# Patient Record
Sex: MALE | Race: BLACK OR AFRICAN AMERICAN | Employment: OTHER | URBAN - METROPOLITAN AREA
[De-identification: names, ages, dates, MRNs, and addresses within clinical notes are randomized per-mention and may not be internally consistent; named-entity substitution may affect disease eponyms.]

---

## 2021-06-22 PROBLEM — I63.9 CVA (CEREBRAL VASCULAR ACCIDENT) (HCC): Status: ACTIVE | Noted: 2021-06-22

## 2021-06-25 PROBLEM — Q23.1 BICUSPID AORTIC VALVE: Status: ACTIVE | Noted: 2021-06-25

## 2021-06-26 PROBLEM — Q90.9 DOWN SYNDROME: Status: ACTIVE | Noted: 2021-06-26

## 2021-06-26 PROBLEM — R13.12 OROPHARYNGEAL DYSPHAGIA: Status: ACTIVE | Noted: 2021-06-26

## 2021-06-26 PROBLEM — Z79.01 WARFARIN ANTICOAGULATION: Status: ACTIVE | Noted: 2021-06-26

## 2021-09-15 PROBLEM — Q23.1 BICUSPID AORTIC VALVE: Chronic | Status: ACTIVE | Noted: 2021-06-25

## 2021-09-15 PROBLEM — Q90.9 DOWN SYNDROME: Chronic | Status: ACTIVE | Noted: 2021-06-26

## 2021-09-15 PROBLEM — R55 SYNCOPE: Status: ACTIVE | Noted: 2021-09-15

## 2022-03-19 PROBLEM — I63.9 CVA (CEREBRAL VASCULAR ACCIDENT) (HCC): Status: ACTIVE | Noted: 2021-06-22

## 2022-03-19 PROBLEM — R55 SYNCOPE: Status: ACTIVE | Noted: 2021-09-15

## 2022-03-19 PROBLEM — Q90.9 DOWN SYNDROME: Status: ACTIVE | Noted: 2021-06-26

## 2022-03-19 PROBLEM — R13.12 OROPHARYNGEAL DYSPHAGIA: Status: ACTIVE | Noted: 2021-06-26

## 2022-03-19 PROBLEM — Q23.1 BICUSPID AORTIC VALVE: Status: ACTIVE | Noted: 2021-06-25

## 2022-03-20 PROBLEM — Z79.01 WARFARIN ANTICOAGULATION: Status: ACTIVE | Noted: 2021-06-26

## 2022-03-28 ENCOUNTER — HOSPITAL ENCOUNTER (OUTPATIENT)
Dept: PHYSICAL THERAPY | Age: 52
Discharge: HOME | End: 2022-03-28
Payer: MEDICARE

## 2022-03-28 PROCEDURE — 97110 THERAPEUTIC EXERCISES: CPT

## 2022-03-28 PROCEDURE — 97166 OT EVAL MOD COMPLEX 45 MIN: CPT

## 2022-03-28 PROCEDURE — 97530 THERAPEUTIC ACTIVITIES: CPT

## 2022-03-28 NOTE — PROGRESS NOTES
[facility] [place] [facility] [employer]  JADYN  [address] [facility]  [phone] fax    Plan of Care/Statement of Necessity for Occupational Therapy Services    Patient name: Moraima Millan Start of Care: 3/28/2022   Referral source: Shante Jeong : 1970    Medical Diagnosis: Weakness of left upper extremity [R29.898]  Payor: Uday Racer / Plan: [address] / Product Type: [employer] Care Medicare /  Onset Date:21    Treatment Diagnosis: WEakness, inattention left UE   Prior Hospitalization: see medical history Provider#: [id number]   Medications: Verified on Patient summary List    Comorbidities: Downs syndrome, aortic valve replacement, corneal transplant, colon resection, right CVA   Prior Level of Function: Independent self care, helped fold laundry, put away groceries, could make sandwich          The Plan of Care and following information is based on the information from the initial evaluation. Assessment/ key information: pt is a right hand dominant, 45 y.o.y/o, male who had sudden onset of left side weakness and facial droop due to his/her right CVA  injury on 21 and went to [facility] x 5 days, then had home health x 1 month ended 22. Mother is primary caregiver and provides all medical history. Prior to CVA, patient was fully independent in self care without assistive device and helped with some home care tasks. At present he requires stand by assist during ambulation due to safety issues and left neglect. He does not follow commands well due to intellectual disability as well as left neglect. Standardized testing of muscle strength is not possible.  and pinch measures do not reflect true abilities. He is able to complete fine motor tasks with reduced speed with left and and has  Evidence of left neglect. Sensory awareness of left upper extremity appears intact.   He denies upper extremity pain per mother. He has limited left shoulder flexion and abduction, elbow extension and moves in a modified synergy pattern. Praxis errors are evident in left UE. He currently requires assist in all self care except feeding and must have standby assist in transfers. His FOTO is 48/100 reflecting moderate impairment in function. He will benefit from skilled occupational therapy to improve left UE function and return to prior level of independence and safety in self care and home care. Evaluation Complexity: History MEDIUM Complexity : Expanded review of history including physical, cognitive and psychosocial  history  Examination MEDIUM Complexity : 3-5 performance deficits relating to physical, cognitive , or psychosocial skils that result in activity limitations and / or participation restrictions Clinical Decision Making MEDIUM Complexity : Patient may present with comorbidities that affect occupational performnce.  Miniml to moderate modification of tasks or assistance (eg, physical or verbal ) with assesment(s) is necessary to enable patient to complete evaluation   Overall Complexity Rating: MEDIUM    Problem List: Decreased range of motion, Decreased strength, Decreased coordination/prehension, Decreased ADL/functional abilities  and Decreased transfer abilities     Treatment Plan may include any combination of the following: Therapeutic exercise, Therapeutic activities, Neuromuscular re-education, Patient education and ADLs/IADLs    Patient / Family readiness to learn indicated by: trying to perform skills and interest    Persons(s) to be included in education:   patient (P) and family support person (FSP);list mom    Barriers to Learning/Limitations: yes;  cognitive, reading/writing and sensory deficits-vision/hearing/speech    Patient Goal (s): Per mom, more independent in self care, more use of arm, transfers    Patient Self Reported Health Status: good    Rehabilitation Potential: good    Short Term Goals: To be accomplished in 2 weeks:  1. Mother will be independent in proper procedure for left UE ROM. 2.  Patient will be able to reach to place items with left hand with 50% accuracy to shoulder level. 3.  Patient will be supervision level for left hand strengthening. 4.  Patient will be supervision level for AAROM ( bimanual ) tasks    Long Term Goals: To be accomplished in 4 weeks:   1. Patient will be able to don and doff all clothing with minimal assist for safety. 2.  Patient will be able to fold laundry using left hand assist.  3.  Patient will achieve at least 100 degrees of left shoulder flexion to hang clothes in closet. 4   Patient will improve ability to use left hand as assist as shown by increase in FOTO of at least 10 points. Frequency / Duration: Patient to be seen 2-3 times per week for 4 weeks:    Patient/ Caregiver education and instruction: Diagnosis, prognosis, self care, activity modification and exercises  [x]  Plan of care has been reviewed with B[facility]    Certification Period: 3/28/22-4/27/22    KASANDRA Dallas/L 3/28/2022 1:02 PM      ________________________________________________________________________    I certify that the above Therapy Services are being furnished while the patient is under my care. I agree with the treatment plan and certify that this therapy is necessary.     [address] [place] [address] Signature:____________Date:_________TIME:________     Mathew Mercado PA-C  ** Signature, Date and Time must be completed for valid certification **    Please sign and return to [facility] [employer]   [address] [place] [address]  [phone] fax

## 2022-03-28 NOTE — PROGRESS NOTES
OT DAILY TREATMENT NOTE     Patient Name: Lamine Bennett  Date:3/28/2022  : 1970  [x]  Patient  Verified  Payor: [facility] MEDICARE / Plan: [address] / Product Type: Managed Care Medicare /    In time:1115  Out time:1200  Total Treatment Time (min): 45  Visit #: 1 of 12    Medicare/BCBS Only   Total Timed Codes (min):  23 1:1 Treatment Time:  45     Treatment Area: Stroke (cerebrum) ([facility] [address] [I63.9]    SUBJECTIVE  Pain Level (0-10 scale): 0/10  Any medication changes, allergies to medications, adverse drug reactions, diagnosis change, or new procedure performed?: [x] No    [] Yes (see summary sheet for update)  Subjective functional status/changes:   [] No changes reported  Mom reports patient indicates pain in right leg. OBJECTIVE      22 min [x]Eval                  []Re-Eval       13 min Therapeutic Exercise:  [] See flow sheet :   Rationale: increase ROM to improve the patients ability to reach  PROM, AAROM left UE with guiding of scapula  Review of proper hand position to improve shoulder ROM    10 min Therapeutic Activity:  []  See flow sheet :   Rationale: increase ROM, increase strength and improve coordination  to improve the patients ability to use left UE  Beach ball activity BUEs promote awareness and use of left UE in functional tasks         With   [] TE   [] TA   [] neuro   [] other: Patient Education: [x] Review HEP    [] Progressed/Changed HEP based on: proper grasp of UE during ROM, importance os scapular mobility  [] positioning   [] body mechanics   [] transfers   [] heat/ice application   [] Splint wear/care   [] Sensory re-education   [] scar management      [] other:            Other Objective/Functional Measures:   Subjective: pt is a right hand dominant, 45 y.o.y/o, male who sustained his/her right CVA  injury on 21 and went to [facility] x 5 days, then had home health x 1 month ended 22.     Prior level of function: I self care, folding clothes, could prepare cold meals, helped carry items into home  Pain level:(0-no pain 10-debilitating pain) no    Description/Location: right leg with c/o intermittent rlief   Worst pain10/10 Least pain 0/10   Activities which aggravate pain: night   Activities which ease pain: chiropractor,   Current functional limitations/living situation: with mom, her  and sister in home. REquires assist for all self care except eating. Medical hx: L3 colon resection, Down's syndrome, aortic valve replacement, corneal transplants    Medications: See the written copy of this report in the patient's paper medical record.        Objective:  Sensation:Aware of touch, unable to eval precisely due to intellectual disability  connie of Motion:Limited left UE AROM, passive elbow WNL, limited left shoulder PROM in flexion and abduction  Strength:Functional right UE left unable to be assessed accurately due to difficulty following instrucitons  Hand ROM left hand loose fist, no pain with assist to tighter fist  Hand Strength:   Gross Grasp  Lateral Pinch    Right  7  4    Left 3  4      Nine-Hole Peg Test:  Left= _405____seconds  Right=_103____seconds  Finger Opposition:Unable to test    Palpation: no significant subluxation noted    ADLs  Feeding:        []MaxA   []ModA   [x]Rajat   [] CGA   []SBA   []Muna   []Independent  UE Dressing:       [x]MaxA   []ModA   []Rajat   [] CGA   []SBA   []Muna   []Independent  LE Dressing:       [x]MaxA   []ModA   []Rajat   [] CGA   []SBA   []Muna   []Independent  Grooming:       []MaxA   [x]ModA   []Rajat   [] CGA   []SBA   []Muna   []Independent  Toileting:       [x]MaxA   []ModA   []Rajat   [] CGA   []SBA   []Muna   []Independent  Bathing:       [x]MaxA   []ModA   []Rajat   [] CGA   []SBA   []Muna   []Independent  Light Meal Prep:    [x]MaxA   []ModA   []Rajat   [] CGA   []SBA   []Muna   []Independent  Household/Other:  [x]MaxA   []ModA   []Rajat   [] CGA   []SBA   []Muna []Independent  Adaptive Equip:     []MaxA   []ModA   []Rajat   [] CGA   []SBA   []Muna   []Independent  Driving:       [x]MaxA   []ModA   []Rajat   [] CGA   []SBA   []Muna   []Independent  Money Mgmt:        [x]MaxA   []ModA   []Rajat   [] CGA   []SBA   []Muna   []Independent    Coordination   GM                           FM []WFL          [x] Impaired   []WFL          [x] Impaired    Tone/Motor Control []WFL          [x] Impaired    Midline Symmetry []WFL          [x] Impaired    Visual Perception:                    R/L   Neglect           R/L Discrimination                   Body Scheme []WFL          [] Impaired Unable  []WFL          [x] Impaired     []WFL          [x] Impaired     []WFL          [x] Impaired    Visual Motor Skills                           Tracking                           Tracing                            Writing   [x]WFL          [] Impaired     []WFL          [] Impaired NT  []WFL          [] Impaired NT   Cognition []Person         []Place            []Date   []Situation/ Behavior Unable to assess   Follows Commands  [x]     1-step  [] 2-step   []Multi-step      Memory:                             STM                             LTM   []WFL          [] Impaired UTT  []WFL          [] Impaired UTT   Safety Awareness []WFL          [x] Impaired    Judgment  []WFL          [x] Impaired    Express Needs []WFL          [x] Impaired           Pain Level (0-10 scale) post treatment: 0/10    ASSESSMENT/Changes in Function:    [x]  See Plan of Care  []  See progress note/recertification  []  See Discharge Summary             PLAN  []  Upgrade activities as tolerated     []  Continue plan of care  []  Update interventions per flow sheet       []  Discharge due to:_  []  Other:_      Melissa Moore, OTR/L 3/28/2022  11:11 AM    Future Appointments   Date Time Provider Janis Conteh   3/28/2022 11:15 AM Laura Babb, OTR/L [facility]   3/31/2022 12:45 PM Farrah Pradhan, PT [facility] 8/18/2022  2:00 PM Freddie Man PA-C [id number]

## 2022-03-31 ENCOUNTER — HOSPITAL ENCOUNTER (OUTPATIENT)
Dept: PHYSICAL THERAPY | Age: 52
Discharge: HOME | End: 2022-03-31
Payer: MEDICARE

## 2022-03-31 PROCEDURE — 97530 THERAPEUTIC ACTIVITIES: CPT

## 2022-03-31 PROCEDURE — 97162 PT EVAL MOD COMPLEX 30 MIN: CPT

## 2022-03-31 NOTE — PROGRESS NOTES
PT DAILY TREATMENT NOTE/NEURO EVAL     Patient Name: Lonell Schilder  Date:3/31/2022  : 1970  [x]  Patient  Verified  Payor: [employer] MEDICARE / Plan: [address] / Product Type: Managed Care Medicare /    In time:12:47A  Out time:1:35P      Total Treatment Time (min): 50  Visit #: 1 of 12    Medicare/BCBS Only   Total Timed Codes (min):  25 1:1 Treatment Time:  48     Treatment Area: Stroke (cerebrum) ([facility] [address] [I63.9]    SUBJECTIVE  Pain Level (0-10 scale): patient reported pain, unable to assess numerical value  []constant []intermittent []improving []worsening []no change since onset    Any medication changes, allergies to medications, adverse drug reactions, diagnosis change, or new procedure performed?: [x] No    [] Yes (see summary sheet for update)  Subjective functional status/changes:         Comorbidities:Down syndrome, aortic valve replacement, corneal transplant, colon resection, right CVA   Prior Level of Function: lives in 1 story home with family (mother, father, and sister-caregiver)                            The Plan of Care and following information is based on the information from the initial evaluation. Assessment/ key information: Patient is a 54-year-old right-handed male who presents to therapy s/p right  CVA of 2021. Patient had home health therapy in January. Mother reports there is still weakness in the left UE and LE. Patient was independent with ambulation prior to CVA. He has difficulty navigating steps to enter/exit garage. Patient had been having pain in the right LE caused by nerve irritation at L3-L4 vertebral levels; patient has gotten relief from chiropractic care. She reports subluxation of the shoulder has improved. Patient has not had any recent falls but demonstrates misstep/near LOB occasionally. Patient ambulates with decreased gait speed and shuffled steps; standby to [place] [id number] for safety.  Left shoulder AROM into elevation grossly limited with therapist donning/doffing gait belt. Right LE strength at least 3/5; left LE strength slightly diminished (3-/5). Unable to formally assess MMT due to patient with difficulty understanding testing sequencing. Supervision to standby for sit to stand following cues for scooting forward on seat and hand/foot placement. Patient demonstrates left neglect with patient grazing left side of doorway with entering and exiting evaluation room and attempted to turn right each time upon entering and exiting. Patient required max verbal, visual, and tactile for instruction/setup with decreased cues needed for following reps of activities. Patient required heavy handheld assist and had max difficulty with attempting to clear low jennifer. He was able to step around/weave through 1 of 3 cones placed on floor; unclear if due to cognition or left neglect. Patient would benefit from skilled outpatient PT to address above mentioned deficits to return to prior level of function, increase independence with ADLs, and improve overall quality of life.     Patients mother grants permission for progress notes to be sent to  neurologist  Edson Whaley MD.      23 min [x]Eval                  []Re-Eval         25 min Therapeutic Activity:  [x]  See flow sheet : patient and family education   Rationale: increase ROM, increase strength, improve coordination, improve balance and increase proprioception  to improve the patients ability to perform ADLs with increased ease             With   [] TE   [x] TA   [] neuro   [] other: Patient Education: [x] Review HEP    [] Progressed/Changed HEP based on:   [] positioning   [] body mechanics   [] transfers   [] heat/ice application    [x] other: diagnosis, prognosis, POC, purpose and importance of therapy; anatomy and physiology as it relates to current condition; examination findings; limitations with progression due to comorbidities and impaired cognition; obtaining gait belt for safety/stability at home; having patient do as much as possible safely to increase patient's strength/independence and reduce caregiver burden     Other Objective/Functional Measures:     Physical Therapy Evaluation - Neurologic    Posture: [] Poor    [x] Fair    [] Good    Describe:       Gait: [] Normal    [x] Abnormal    Device: shuffled steps, decreased gait speed    Describe:    Left shoulder AROM limited into elevation      Strength (MMT): at least 3/5 for R LEs into hip flex, knee flex, knee ext, and ankle DF; left grossly 3/5    Reflexes: [x] Not Tested   Left Right   Biceps (C5)     Brachioradiais (C6)     Triceps (C7)     Knee Jerk (L4)     Ankle Jerk (SI)       Balance/ Equilibrium:           Sitting Balance: Static:  [x] Good    [] Fair    [] Poor     Dynamic:   [] Good    [] Fair    [] Poor        Standing Balance: Static:   [] Good    [x] Fair    [] Poor     Dynamic:   [] Good    [] Fair    [x] Poor        Protective Extension:  [] Present    [] Delayed    [] Absent        Single Leg Stance:         Eyes Open  Eyes Closed   L  L    R  R        Functional Mobility      Bed Mobility:      Scooting:        Rolling:       Sit-Supine:      Transfers:       Sit-Stand:       Floor-Stand:       Gait:       Tandem:       Backwards:       Braiding:      Elevations:       Curbs:      Ramps:      Stairs:    Behavior: [x] Cooperative    [] Impulsive    [] Agitated    [] Perseverative    [] Confused   Oriented x:    Cognition: [x] One Step Commands   [] Multiple Commands   [] Displays Neglect [] R  [x] L    Other:       Impaired Judgement: [] Y    [x] N      Impaired Vision:  [x] Y    [] N      Safety Awareness Deficits  [x] Y    [] N      Impaired Hearing  [] Y    [x] N      Able to Express Needs [] Y    [x] N    Optional Tests:       Dynamic Gait Index (24pt scale): Functional Gait Assessment (30pt scale):       Morin Balance Scale (56pt scale):     Other test /comments:     Max verbal, visual, and tactile cues for instruction/setup with decreased cues needed for following reps of activities  Supervision to standby for sit to stand following cues for scooting forward on seat and hand/foot placement  Patient able to respond with mainly \"yes\" or with difficult to understand responses  Instances of left neglect-patient grazed left side of doorway with entering and exiting evaluation room and attempted to turn right each time upon entering and exiting  Low hurdles-max handheld assist and difficulty with clearance  Able to step around/weave through 1 of 3 cones-unclear if due to cognition or left neglect       Pain Level (0-10 scale) post treatment: \"yes\"-pointing to left hip    ASSESSMENT/Changes in Function: See POC    Patient will continue to benefit from skilled PT services to modify and progress therapeutic interventions, address functional mobility deficits, address ROM deficits, address strength deficits, analyze and address soft tissue restrictions, analyze and cue movement patterns, analyze and modify body mechanics/ergonomics, assess and modify postural abnormalities, address imbalance/dizziness and instruct in home and community integration to attain remaining goals.      [x]  See Plan of Care  []  See progress note/recertification  []  See Discharge Summary         Progress towards goals / Updated goals:  See POC    PLAN  [x]  Upgrade activities as tolerated     [x]  Continue plan of care  []  Update interventions per flow sheet       []  Discharge due to:_  []  Other:_      Estela Casarez, PT 3/31/2022  12:35 PM

## 2022-03-31 NOTE — PROGRESS NOTES
[facility] JADYN  [address] [place] [address]  [phone] fax    Plan of Care/ Statement of Necessity for Physical Therapy Services    Patient name: Jacques Suárez Start of Care: 3/31/2022   Referral source: Melchor Bartholomew : 1970    Medical Diagnosis: Stroke (cerebrum) ([address] [I63.9]  Payor: Venkat Babb / Plan: [address] / Product Type: [employer] Medicare /  Onset Date:2021   Treatment Diagnosis: Bilateral LE weakness, abnormalities of gait and mobility   Prior Hospitalization: see medical history Provider#: [id number]   Medications: Verified on Patient summary List    Comorbidities:Down syndrome, aortic valve replacement, corneal transplant, colon resection, right CVA   Prior Level of Function: lives in 1 story home with family (mother, father, and sister-caregiver)      The Plan of Care and following information is based on the information from the initial evaluation. Assessment/ key information: Patient is a 62-year-old right-handed male who presents to therapy s/p right  CVA of 2021. Patient had home health therapy in January. Mother reports there is still weakness in the left UE and LE. Patient was independent with ambulation prior to CVA. He has difficulty navigating steps to enter/exit garage. Patient had been having pain in the right LE caused by nerve irritation at L3-L4 vertebral levels; patient has gotten relief from chiropractic care. She reports subluxation of the shoulder has improved. Patient has not had any recent falls but demonstrates misstep/near LOB occasionally. Patient ambulates with decreased gait speed and shuffled steps; [facility]. Left shoulder AROM into elevation grossly limited with therapist donning/doffing gait belt. Right LE strength at least 3/5; left LE strength slightly diminished (3-/5).  Unable to formally assess MMT due to patient with difficulty understanding testing sequencing. Supervision to standby for sit to stand following cues for scooting forward on seat and hand/foot placement. Patient demonstrates left neglect with patient grazing left side of doorway with entering and exiting evaluation room and attempted to turn right each time upon entering and exiting. Patient required max verbal, visual, and tactile for instruction/setup with decreased cues needed for following reps of activities. Patient required heavy handheld assist and had max difficulty with attempting to clear low jennifer. He was able to step around/weave through 1 of 3 cones placed on floor; unclear if due to cognition or left neglect. Patient would benefit from skilled outpatient PT to address above mentioned deficits to return to prior level of function, increase independence with ADLs, and improve overall quality of life. Mother present for evaluation and provided subjective information.      Evaluation Complexity History HIGH Complexity :3+ comorbidities / personal factors will impact the outcome/ POC ; Examination HIGH Complexity : 4+ Standardized tests and measures addressing body structure, function, activity limitation and / or participation in recreation  ;Presentation MEDIUM Complexity : Evolving with changing characteristics  ; Clinical Decision Making MEDIUM Complexity : FOTO score of 26-74  Overall Complexity Rating: MEDIUM  Problem List: pain affecting function, decrease ROM, decrease strength, impaired gait/ balance, decrease ADL/ functional abilitiies, decrease activity tolerance, decrease flexibility/ joint mobility and decrease transfer abilities   Treatment Plan may include any combination of the following: Therapeutic exercise, Therapeutic activities, Neuromuscular re-education, Physical agent/modality, Gait/balance training, Manual therapy, Patient education, Self Care training, Functional mobility training, Home safety training and Stair training  Patient / Family readiness to learn indicated by: asking questions, trying to perform skills and interest  Persons(s) to be included in education: patient (P) and family support person (FSP);list: mother  Barriers to Learning/Limitations: yes;  cognitive  Patient Goal (s): walk better and have better balance  Patient Self Reported Health Status: fair  Rehabilitation Potential: fair    Short Term Goals: To be accomplished in 1 weeks:   1. Patient and family will report compliance with initial HEP to optimize therapy outcomes. Long Term Goals: To be accomplished in 4 weeks:   1. Patient will improve FOTO score by at least 5 points in order to demonstrate functional improvement. 2. Patient will report \"no difficulty\" with \"Walk[ing] around 1 level (floor) of your home\" with FOTO in order to demonstrate improved tolerance to daily tasks. 3. Patient will be able to perform sit to stand transfer with no more than mod I and min verbal cues to navigate home with increased ease. 4. Patient will be able to ambulate at least 50 ft with no more than supervision and without LOB in order to navigate home with increased safety. 5. Patient will be able to ascend/descend at least 4\" step x3 in order to progress with stair navigation. Frequency / Duration: Patient to be seen 2-3 times per week for 4 weeks. Patient/ Caregiver education and instruction: Diagnosis, prognosis, self care, activity modification and exercises   [x]  Plan of care has been reviewed with PTA    Certification Period: 3/31/22-4/29/22  Oj Perez, PT 3/31/2022 12:36 PM    ________________________________________________________________________    I certify that the above Therapy Services are being furnished while the patient is under my care. I agree with the treatment plan and certify that this therapy is necessary.     Physician's Signature:____________Date:_________TIME:________     Kimmy Triana PA-C  ** Signature, Date and Time must be completed for valid certification **    Please sign and return to [facility]  [address] [place] [address]  [phone] fax

## 2022-04-04 ENCOUNTER — HOSPITAL ENCOUNTER (OUTPATIENT)
Dept: PHYSICAL THERAPY | Age: 52
Discharge: HOME | End: 2022-04-04
Payer: MEDICARE

## 2022-04-04 PROCEDURE — 97530 THERAPEUTIC ACTIVITIES: CPT

## 2022-04-04 PROCEDURE — 97535 SELF CARE MNGMENT TRAINING: CPT

## 2022-04-04 PROCEDURE — 97112 NEUROMUSCULAR REEDUCATION: CPT

## 2022-04-04 NOTE — PROGRESS NOTES
OT DAILY TREATMENT NOTE     Patient Name: Nando Nesbitt  Date:2022  : 1970  [x]  Patient  Verified  Payor: [employer] MEDICARE / Plan: [address] / Product Type: Managed Care Medicare /    In time:1245  Out time:130  Total Treatment Time (min): 45  Visit #: 2 of 8    Medicare/BCBS Only   Total Timed Codes (min):  45 1:1 Treatment Time:  45     Treatment Area: Weakness of left upper extremity [R29.898]    SUBJECTIVE  Pain Level (0-10 scale): 0/10  Any medication changes, allergies to medications, adverse drug reactions, diagnosis change, or new procedure performed?: [x] No    [] Yes (see summary sheet for update)  Subjective functional status/changes:   [] No changes reported  I can't believe he is doing that    OBJECTIVE          15 min Therapeutic Activity:  []  See flow sheet :   Rationale: increase ROM and improve coordination  to improve the patients ability to reaching  Oven mitt on right to force use of left  Saebo tree levels 1-2 x 4 with left hand  Cone stacking from center to left side with left hand          30 min Self Care/Home Management: fasteners   Rationale: improve coordination  to improve the patients ability to use left hand  REmoved both hands with assist to locate lace to pull  Replaced both shoes with min assist to get back up  Removed right sock, replaced right sock with both hands  Shoetying trial on shoe then on board  Removed belt from keeper      With   [] TE   [] TA   [] neuro   [] other: Patient Education: [x] Review HEP    [] Progressed/Changed HEP based on: use of oven mitt to prevent use of right hand  [] positioning   [] body mechanics   [] transfers   [] heat/ice application   [] Splint wear/care   [] Sensory re-education   [] scar management      [] other:            Other Objective/Functional Measures:   Min assist to don shoes with help for tying  No assist for right sock     Pain Level (0-10 scale) post treatment: 0/10    ASSESSMENT/Changes in Function: Improving use of right hand with less neglect    Patient will continue to benefit from skilled OT services to modify and progress therapeutic interventions, address ROM deficits, address strength deficits, analyze and address soft tissue restrictions, analyze and cue movement patterns and instruct in home and community integration to attain remaining goals. []  See Plan of Care  []  See progress note/recertification  []  See Discharge Summary         Progress towards goals / Updated goals:  1. Mother will be independent in proper procedure for left UE ROM. 2.  Patient will be able to reach to place items with left hand with 50% accuracy to shoulder level. 4/4 progressing with cone stakcing and saebo tree  3. Patient will be supervision level for left hand strengthening. 4.  Patient will be supervision level for AAROM ( bimanual ) tasks     Long Term Goals: To be accomplished in 4 weeks:              1.  Patient will be able to don and doff all clothing with minimal assist for safety. 4/4 progressing with shoes and socks  2. Patient will be able to fold laundry using left hand assist.  3.  Patient will achieve at least 100 degrees of left shoulder flexion to hang clothes in closet. 4   Patient will improve ability to use left hand as assist as shown by increase in FOTO of at least 10 points.     PLAN  []  Upgrade activities as tolerated     []  Continue plan of care  []  Update interventions per flow sheet       []  Discharge due to:_  []  Other:_      Arturo Palafox OTR/L 4/4/2022  8:14 AM    Future Appointments   Date Time Provider Janis Conteh   4/4/2022 12:45 PM Aleatha Dancer, OTR/L MMCPTPB [facility]   4/4/2022  1:30 PM Farrah Staples, PT MMCPTP[facility]   4/5/2022 10:30 AM Farrah Staples, PT [facility]CPTP[facility]   4/12/2022  2:15 PM Rozell Osler, PTA [facility]   4/12/2022  3:00 PM Aleatha Dancer, OTR/L MMCPTPB [facility]   4/15/2022  3:00 PM Rozell Osler, PTA [facility]CPTP[facility]   4/15/2022  3:45 PM Ananth Normandavid PQCSFLK [facility]   4/18/2022 12:45 PM Ryder Shen, OTR/L [facility]CPTPB [facility]   4/18/2022  1:30 PM Farrah Martins, PT [id number] [facility]   4/21/2022 12:45 PM Farrah Martins, PT MMCPTPB [facility]   4/21/2022  1:30 PM Ryder Shen, OTR/L MMCPTPB [facility]   4/25/2022  1:30 PM Farrah Martins, PT EBUMIUY [facility]   4/25/2022  2:15 PM Ryder Shen, OTR/L MMCPTPB [facility]   4/28/2022 12:45 PM Farrah Martins, PT MMCPTPB [facility]   4/28/2022  1:30 PM Ryder Shen, OTR/L UUSGVCM [facility]   8/18/2022  2:00 PM Lisette Vidal, [address]

## 2022-04-04 NOTE — PROGRESS NOTES
PT DAILY TREATMENT NOTE     Patient Name: Mary Topete  Date:2022  : 1970  [x]  Patient  Verified  Payor: [place] [employer] MEDICARE / Plan: [address] / Product Type: Managed Care Medicare /    In time:1:30P  Out time:2:08P  Total Treatment Time (min): 38  Visit #: 2 of 12    Medicare/BCBS Only   Total Timed Codes (min):  38 1:1 Treatment Time:  38       Treatment Area: Lower extremity weakness [R29.898]  Abnormal gait [R26.9]    SUBJECTIVE  Pain Level (0-10 scale): \"yes\"  Any medication changes, allergies to medications, adverse drug reactions, diagnosis change, or new procedure performed?: [x] No    [] Yes (see summary sheet for update)  Subjective functional status/changes:   [] No changes reported    Patient's mother reports he prefers evening times as he is a \"night owl\".     OBJECTIVE       13 min Therapeutic Activity:  [x]  See flow sheet : glory carcamo, patient and family education   Rationale: increase ROM, increase strength and increase proprioception  to improve the patients ability to perform functional tasks with increased ease     25 min Neuromuscular Re-education:  [x]  See flow sheet :   Rationale: increase ROM, increase strength, improve coordination, improve balance and increase proprioception  to improve the patients ability to perform functional tasks with increased eae              With   [] TE   [x] TA   [] neuro   [] other: Patient Education: [x] Review HEP    [] Progressed/Changed HEP based on:   [] positioning   [] body mechanics   [] transfers   [] heat/ice application    [x] other: ability to purchase gait belt with handles for increased stability with transfers/ambulation     Other Objective/Functional Measures:     Marching-able to perform with unilateral UE support; increased verbal, visual, and tactile cues to perform with alternating LEs but able to perform later in session with B UE support with instruction to \"march\" with decreased range  Sidesteps-max cues to shift hand placement and lift right UE but only min cues to lift/place left LE   Cone taps-BUE support-difficulty with command following with cone taps (placing specific LE to specific color cone) but able to perform 4 cone taps in a row with unilateral UE support   Step ups-able to perform B with only left UE support; attempted to perform taps or step up without UE support/decreased support with palm just placed on bar but patient would grab onto bar  Cone weaving-max verbal and visual cues but able to follow directions for going to right or left   Unable to perform low jennifer-patient did no attempt; mother reported patient may be tired; patient responded \"yes\" when asked if he was tired  Two seated rest breaks during session    Left UE support primarily and therapist standing on left side to encourage awareness of left side     Pain Level (0-10 scale) post treatment: \"no\"    ASSESSMENT/Changes in Function: Patient demonstrates difficulty with command following requiring mod to max cues for setup/sequencing with activities in therapy. Increased cues for marching with right LE possibly due to decreased left LE balance/stability. He ambulates with decreased gait speed and decreased foot clearance B limiting independence with ambulation. Ability to step around cones (right then left) improved this visit. Patient with decreased overall endurance leading to seated rest breaks throughout session and limiting ability to complete jennifer step overs at end of session.      Patient will continue to benefit from skilled PT services to modify and progress therapeutic interventions, address functional mobility deficits, address ROM deficits, address strength deficits, analyze and address soft tissue restrictions, analyze and cue movement patterns, analyze and modify body mechanics/ergonomics, assess and modify postural abnormalities, address imbalance/dizziness and instruct in home and community integration to attain remaining goals. Progress towards goals / Updated goals:  Short Term Goals: To be accomplished in 1 weeks:              1. Patient and family will report compliance with initial HEP to optimize therapy outcomes. Long Term Goals: To be accomplished in 4 weeks:              1. Patient will improve FOTO score by at least 5 points in order to demonstrate functional improvement. 2. Patient will report \"no difficulty\" with \"Walk[ing] around 1 level (floor) of your home\" with FOTO in order to demonstrate improved tolerance to daily tasks. 3. Patient will be able to perform sit to stand transfer with no more than mod I and min verbal cues to navigate home with increased ease. 4. Patient will be able to ambulate at least 50 ft with no more than supervision and without LOB in order to navigate home with increased safety. 5. Patient will be able to ascend/descend at least 4\" step x3 in order to progress with stair navigation.     PLAN  [x]  Upgrade activities as tolerated     [x]  Continue plan of care  []  Update interventions per flow sheet       []  Discharge due to:_  []  Other:_      Lindy Betts, PT 4/4/2022  8:55 AM    Future Appointments   Date Time Provider Janis Conteh   4/4/2022 12:45 PM Berkley Gayle, OTR/L MMCPTPB [facility]   4/4/2022  1:30 PM Farrah Tovar, PT MQLPICB [facility]   4/5/2022 10:30 AM Farrah Tovar, PT MMCPTPB [facility]   4/12/2022  2:15 PM Karolina Velasco, PTA [facility]CPTP[facility]   4/12/2022  3:00 PM Berkley Gayle, OTR/L MMCPTPB [facility]   4/15/2022  3:00 PM Karolina Velasco, PTA MMCPTP[facility]   4/15/2022  3:45 PM Juan Jackson SHRSCDP [facility]   4/18/2022 12:45 PM Berkley Gayle, OTR/L MMCPTPB [facility]   4/18/2022  1:30 PM Farrah Tovar, PT MMCPTPB [facility]   4/21/2022 12:45 PM Fararh Tovar, PT MMCPTPB [facility]   4/21/2022  1:30 PM Berkley Gayle, OTR/L MMCPTPB [facility]   4/25/2022  1:30 PM Farrah Tovar, PT [id number] [facility]   4/25/2022  2:15 PM Domi Vanegas, OTR/L MMCPTPB [facility]   4/28/2022 12:45 PM Farrah Salazar, PT MMCPTPB [facility]   4/28/2022  1:30 PM Domi Vanegas, OTR/L IHGJCKM [facility]   8/18/2022  2:00 PM Freddie Man, [address]

## 2022-04-05 ENCOUNTER — HOSPITAL ENCOUNTER (OUTPATIENT)
Dept: PHYSICAL THERAPY | Age: 52
Discharge: HOME | End: 2022-04-05
Payer: MEDICARE

## 2022-04-05 PROCEDURE — 97530 THERAPEUTIC ACTIVITIES: CPT

## 2022-04-05 PROCEDURE — 97112 NEUROMUSCULAR REEDUCATION: CPT

## 2022-04-05 NOTE — PROGRESS NOTES
PT DAILY TREATMENT NOTE     Patient Name: Pardeep Villanueva  Date:2022  : 1970  [x]  Patient  Verified  Payor: [facility] MEDICARE / Plan: [address] / Product Type: Managed Care Medicare /    In time: 11:15  Out time: 12:00  Total Treatment Time (min): 45  Visit #: 3 of 12    Medicare/BCBS Only   Total Timed Codes (min):  45 1:1 Treatment Time:  45       Treatment Area: Lower extremity weakness [R29.898]  Abnormal gait [R26.9]    SUBJECTIVE  Pain Level (0-10 scale): \"no\"  Any medication changes, allergies to medications, adverse drug reactions, diagnosis change, or new procedure performed?: [x] No    [] Yes (see summary sheet for update)  Subjective functional status/changes:   [] No changes reported  Pt notes no pain upon entering therapy. Pt's mom states she hasn't seen anything major she needs therapy to work on besides stairs as she tried once to get up to the room over the garage but decided she wouldn't do that again since it was so hard to do and pt was fearful. She reports noticing less use of the left side and less awareness but is trying to implement therapy recommendations. She states pt likes to watch TV a lot but used to do all the folding and now she is having to do it all.      OBJECTIVE     15 min Therapeutic Activity:  [x]  See flow sheet : stairs; step ups; cones into cabinets   Rationale: increase ROM, increase strength and increase proprioception  to improve the patients ability to perform functional tasks with increased ease     30 min Neuromuscular Re-education:  [x]  See flow sheet : see below   Rationale: increase ROM, increase strength, improve coordination, improve balance and increase proprioception  to improve the patients ability to perform functional tasks with increased ease            With   [] TE   [] TA   [] neuro   [] other: Patient Education: [x] Review HEP    [] Progressed/Changed HEP based on:   [] positioning   [] body mechanics   [] transfers   [] heat/ice application    [] other:      Other Objective/Functional Measures:   Left side neglect  Tactile and verbal cues to increase left scanning and awareness  Educated mom to be on his left side for conversation and cueing and to move things he likes to the left to encourage more turning to that direction    Pt able to negotiate 3 stairs but by 4th step had increased respiration and fear requiring cues from mom, student and PTA to encourage to side step back down the stairs and have a seated rest break to calm down  Noted instances of possible depth perception issues with stepping out of //, down the curb and with the stairs that may be increasing fear as well    Improved scanning using arc both small and large range but performed in standing as OT is working on in sitting for left arm exercise so focused on balance with weight shift approach    Cues throughout to increase use of left UE for tasks like cone reaching and alternating high fives    Pain Level (0-10 scale) post treatment: \"no\" but \"yes\" for tired    ASSESSMENT/Changes in Function: Pt with high fear for stairs and will benefit from progressing in // to gain confidence and potential screen for issues with depth perception. He struggles with multi-step commands but handles simple commands with addition of tactile and verbal cues. He has good static standing balance but struggles with left neglect causing increase reliance on right UE for all functional tasks. Pt's mom encouraged to utilize more things the patient likes on his left to increase left scanning and awareness.      Patient will continue to benefit from skilled PT services to modify and progress therapeutic interventions, address functional mobility deficits, address ROM deficits, address strength deficits, analyze and address soft tissue restrictions, analyze and cue movement patterns, analyze and modify body mechanics/ergonomics, assess and modify postural abnormalities, address imbalance/dizziness and instruct in home and community integration to attain remaining goals. Progress towards goals / Updated goals:  Short Term Goals: To be accomplished in 1 weeks:              1. Patient and family will report compliance with initial HEP to optimize therapy outcomes. Long Term Goals: To be accomplished in 4 weeks:              1. Patient will improve FOTO score by at least 5 points in order to demonstrate functional improvement. 2. Patient will report \"no difficulty\" with \"Walk[ing] around 1 level (floor) of your home\" with FOTO in order to demonstrate improved tolerance to daily tasks. 3. Patient will be able to perform sit to stand transfer with no more than mod I and min verbal cues to navigate home with increased ease. 4. Patient will be able to ambulate at least 50 ft with no more than supervision and without LOB in order to navigate home with increased safety. 5. Patient will be able to ascend/descend at least 4\" step x3 in order to progress with stair navigation.     PLAN  [x]  Upgrade activities as tolerated     [x]  Continue plan of care  []  Update interventions per flow sheet       []  Discharge due to:_  []  Other:_      Cee Dougherty PTA 4/5/2022  8:55 AM    Future Appointments   Date Time Provider Janis Conteh   4/5/2022 11:15 AM Genette Netters, PTA [facility]   4/12/2022  2:15 PM Genette Netroyce, PTA [facility]   4/12/2022  3:00 PM Ang Morrissey, OTR/L [facility]   4/15/2022  3:00 PM Genette Netters, PTA [facility]   4/15/2022  3:45 PM Ophelia SHANEIWFHR [facility]   4/18/2022 12:45 PM Ang Morrissey, OTR/L [facility]CPTPB [facility]   4/18/2022  1:30 PM Farrah Robles, PT [facility]   4/21/2022 12:45 PM Farrah Robles, PT [facility]   4/21/2022  1:30 PM Ang Morrissey, OTR/L RNHZANG [facility]   4/25/2022  1:30 PM Farrah Robles, PT [facility]   4/25/2022  2:15 PM Ang Morrissey, OTR/L IUUFMNB [facility]   4/28/2022 12:45 PM Farrah Pak, PT [facility]   4/28/2022  1:30 PM Malini Rincon, OTR/L KEYONNA [facility]   8/18/2022  2:00 PM Luciana Ayers, [address]

## 2022-04-12 ENCOUNTER — HOSPITAL ENCOUNTER (OUTPATIENT)
Dept: PHYSICAL THERAPY | Age: 52
Discharge: HOME | End: 2022-04-12
Payer: MEDICARE

## 2022-04-12 PROCEDURE — 97112 NEUROMUSCULAR REEDUCATION: CPT

## 2022-04-12 PROCEDURE — 97530 THERAPEUTIC ACTIVITIES: CPT

## 2022-04-12 PROCEDURE — 97535 SELF CARE MNGMENT TRAINING: CPT

## 2022-04-12 PROCEDURE — 97110 THERAPEUTIC EXERCISES: CPT

## 2022-04-12 NOTE — PROGRESS NOTES
PT DAILY TREATMENT NOTE     Patient Name: Arlina Hashimoto  Date:2022  : 1970  [x]  Patient  Verified  Payor: [place] [employer] MEDICARE / Plan: [address] / Product Type: Managed Care Medicare /    In time: 2:15  Out time: 3:00  Total Treatment Time (min): 45  Visit #: 4 of 12    Medicare/BCBS Only   Total Timed Codes (min):  45 1:1 Treatment Time:  45       Treatment Area: Lower extremity weakness [R29.898]  Abnormal gait [R26.9]    SUBJECTIVE  Pain Level (0-10 scale): \"no\"  Any medication changes, allergies to medications, adverse drug reactions, diagnosis change, or new procedure performed?: [x] No    [] Yes (see summary sheet for update)  Subjective functional status/changes:   [] No changes reported  Pt notes no pain upon entering therapy. Pt's mom states he has been slower lately, but states it happens in waves. She tried getting him to work on a puzzle but he said \"no. \"  She is going to try a puzzle with larger pieces. Mom reports pt was hard to understand verbally prior to stroke as well. OBJECTIVE     15 min Therapeutic Activity:  -  See flow sheet :  hurdles; ambulation in parallel bar   Rationale: increase ROM, increase strength and increase proprioception  to improve the patients ability to perform functional tasks with increased ease     30 min Neuromuscular Re-education:  [x]  See flow sheet : see below   Rationale: increase ROM, increase strength, improve coordination, improve balance and increase proprioception  to improve the patients ability to perform functional tasks with increased ease            With   [] TE   [] TA   [] neuro   [] other: Patient Education: [x] Review HEP    [] Progressed/Changed HEP based on:   [] positioning   [] body mechanics   [] transfers   [] heat/ice application    [] other:      Other Objective/Functional Measures:      Added cone scavenger hunt working on scanning high and low  and from left to right    Added 2 inch step hurdles; increased tactile and verbal cues to perform correctly    Added cone stacking using left UE    Left side neglect    Tactile and verbal cues to increase left scanning and awareness    Cues throughout to increase use of left UE for tasks like cone reaching and alternating high fives    Pain Level (0-10 scale) post treatment: \"no\" but \"yes\" for tired    ASSESSMENT/Changes in Function:   Pt requires moderate tactile and verbal cues to use left UE and for step hurdles. He uses a slow shuffling gait and requires a gait belt. Pt seems to struggle with depth perception or may be lacking some visual field. He struggles to spot objects when scanning. He would benefit from continued step hurdles to prepare for stair negotiation. He would also benefit from continued scanning exercises to acclimate to his surroundings for safe ambulation. Patient will continue to benefit from skilled PT services to modify and progress therapeutic interventions, address functional mobility deficits, address ROM deficits, address strength deficits, analyze and address soft tissue restrictions, analyze and cue movement patterns, analyze and modify body mechanics/ergonomics, assess and modify postural abnormalities, address imbalance/dizziness and instruct in home and community integration to attain remaining goals. Progress towards goals / Updated goals:  Short Term Goals: To be accomplished in 1 weeks:              1. Patient and family will report compliance with initial HEP to optimize therapy outcomes. Met (4/12/22)  Long Term Goals: To be accomplished in 4 weeks:              1. Patient will improve FOTO score by at least 5 points in order to demonstrate functional improvement. 2. Patient will report \"no difficulty\" with \"Walk[ing] around 1 level (floor) of your home\" with FOTO in order to demonstrate improved tolerance to daily tasks.               3. Patient will be able to perform sit to stand transfer with no more than mod I and min verbal cues to navigate home with increased ease. 4. Patient will be able to ambulate at least 50 ft with no more than supervision and without LOB in order to navigate home with increased safety. 5. Patient will be able to ascend/descend at least 4\" step x3 in order to progress with stair navigation. PLAN  [x]  Upgrade activities as tolerated     [x]  Continue plan of care  []  Update interventions per flow sheet       []  Discharge due to:_  []  Other:_      JONAH Peacock 4/12/2022  8:55 AM  I was present during the entire treatment, directing and participating in the treatment.    JER Pérez     Future Appointments   Date Time Provider Janis Conteh   4/15/2022  3:00 PM Nataly Peterson [facility]   4/15/2022  3:45 PM Claude Greig [id number] [facility]   4/18/2022 12:45 PM Partha Helene, OTR/L [facility]   4/18/2022  1:30 PM Farrah Nicol Jacksonma, PT [facility]   4/21/2022 12:45 PM Farrah Hermelindae Karma, PT [facility]   4/21/2022  1:30 PM Parthajaye Narayan, OTR/L [facility]   4/25/2022  1:30 PM Farrah Hermelindae Karma, PT [id number] [facility]   4/25/2022  2:15 PM Partha Helene, OTR/L [facility]CP[facility]   4/28/2022 12:45 PM Farrahmoncho Jacksonma, PT [facility]   4/28/2022  1:30 PM Parthajaye Narayan, OTR/L [facility]CP[facility]   8/18/2022  2:00 PM Angel Johnston, [address]

## 2022-04-12 NOTE — PROGRESS NOTES
OT DAILY TREATMENT NOTE     Patient Name: Arlina Hashimoto  Date:2022  : 1970  [x]  Patient  Verified  Payor: [facility] MEDICARE / Plan: [address] / Product Type: Managed Care Medicare /    In time:300  Out time:345  Total Treatment Time (min): 45  Visit #: 3 of 12    Medicare/BCBS Only   Total Timed Codes (min):  45 1:1 Treatment Time:  45     Treatment Area: Weakness of left upper extremity [R29.898]    SUBJECTIVE  Pain Level (0-10 scale): 0/10  Any medication changes, allergies to medications, adverse drug reactions, diagnosis change, or new procedure performed?: [x] No    [] Yes (see summary sheet for update)  Subjective functional status/changes:   [] No changes reported  Mom stated he always did it this way ( and demonstrated sock roll)    OBJECTIVE      10 min Therapeutic Exercise:  [] See flow sheet :   Rationale: increase ROM and increase strength to improve the patients ability to reach  BUE on 1# dowel batting beach ball to OT    15 min Therapeutic Activity:  []  See flow sheet :   Rationale: increase ROM and increase strength  to improve the patients ability to reach, attend to left  REaching balance activity in standing using left hand ot progress rings on shoulder arc. REquired mitt and manual cues ot left hand and occasional hand over hand to complete         20 min Self Care/Home Management: folding socks   Rationale: increase ROM, increase strength and improve coordination  to improve the patients ability to perform home care tasks  Folding towel, button front shirt, shorts  , pairing and rolling socks  With   [] TE   [] TA   [] neuro   [] other: Patient Education: [x] Review HEP    [] Progressed/Changed HEP based on:   [] positioning   [] body mechanics   [] transfers   [] heat/ice application   [] Splint wear/care   [] Sensory re-education   [] scar management      [] other:            Other Objective/Functional Measures:    Max cueing to attend to left hand today during shoulder arc   Mom assisted by demonstrating prior method to patient  Required oven mitt to avoid reaching with right hand to move rings on shoulder arc    Pain Level (0-10 scale) post treatment: 0/10    ASSESSMENT/Changes in Function: increased cueing ot use left hand and look left today    Patient will continue to benefit from skilled OT services to modify and progress therapeutic interventions, address ROM deficits, address strength deficits, analyze and address soft tissue restrictions, analyze and cue movement patterns and instruct in home and community integration to attain remaining goals. []  See Plan of Care  []  See progress note/recertification  []  See Discharge Summary         Progress towards goals / Updated goals:  1.  Mother will be independent in proper procedure for left UE ROM. 2.  Patient will be able to reach to place items with left hand with 50% accuracy to shoulder level. 4/4 progressing with cone stakcing and saebo tree  3.  Patient will be supervision level for left hand strengthening. 4.  Patient will be supervision level for AAROM ( bimanual ) tasks     Long Term Goals: To be accomplished in 4 weeks:              1.  Patient will be able to don and doff all clothing with minimal assist for safety. 4/4 progressing with shoes and socks  2.  Patient will be able to fold laundry using left hand assist.  4/12: verbal cueing required  3.  Patient will achieve at least 100 degrees of left shoulder flexion to hang clothes in closet. 4   Patient will improve ability to use left hand as assist as shown by increase in FOTO of at least 10 points.     PLAN  []  Upgrade activities as tolerated     [x]  Continue plan of care  []  Update interventions per flow sheet       []  Discharge due to:_  []  Other:_      KASANDRA Cruz/L 4/12/2022  6:52 PM    Future Appointments   Date Time Provider Janis Conteh   4/15/2022  3:00 PM Candy Brown PTA [id number] [facility]   4/15/2022  3:45 PM Climmie Counts [id number] [facility]   4/18/2022 12:45 PM [place] Clarence, OTR/L MMCPTPB [facility]   4/18/2022  1:30 PM Farrah Star [place], PT [id number] [facility]   4/21/2022 12:45 PM Farrah Star [place], PT [facility]CPTPB [facility]   4/21/2022  1:30 PM [place] Clarence, OTR/L [facility]CPTPB [facility]   4/25/2022  1:30 PM Farrah Star [place], PT [id number] [facility]   4/25/2022  2:15 PM [place] Clarence, OTR/L MMCPTPB [facility]   4/28/2022 12:45 PM Farrah Star [place], PT [facility]CP[facility]B [facility]   4/28/2022  1:30 PM [place] Clarence, OTR/L [facility]CPTPB [facility]   8/18/2022  2:00 PM Gen Austin, [address]

## 2022-04-15 ENCOUNTER — HOSPITAL ENCOUNTER (OUTPATIENT)
Dept: PHYSICAL THERAPY | Age: 52
Discharge: HOME | End: 2022-04-15
Payer: MEDICARE

## 2022-04-15 PROCEDURE — 97535 SELF CARE MNGMENT TRAINING: CPT

## 2022-04-15 PROCEDURE — 97110 THERAPEUTIC EXERCISES: CPT

## 2022-04-15 PROCEDURE — 97112 NEUROMUSCULAR REEDUCATION: CPT

## 2022-04-15 NOTE — PROGRESS NOTES
PT DAILY TREATMENT NOTE     Patient Name: Barbara Moura  Date:4/15/2022  : 1970  [x]  Patient  Verified  Payor: [place] [facility] [employer]IC[facility] / Plan: [address] / Product Type: Managed Care Medicare /    In time: 2:15  Out time: 3:00  Total Treatment Time (min): 45  Visit #: 5 of 12    Medicare/BCBS Only   Total Timed Codes (min): 45 1:1 Treatment Time: 45       Treatment Area: Lower extremity weakness [R29.898]  Abnormal gait [R26.9]    SUBJECTIVE  Pain Level (0-10 scale): \"no\"  Any medication changes, allergies to medications, adverse drug reactions, diagnosis change, or new procedure performed?: [x] No    [] Yes (see summary sheet for update)  Subjective functional status/changes:   [] No changes reported  Pt reports no current pain and wasn't tired. His mom states she is going to sit in the waiting room to take a break.      OBJECTIVE     25 min Therapeutic Activity:  -  See flow sheet :  See below   Rationale: increase ROM, increase strength and increase proprioception  to improve the patients ability to perform functional tasks with increased ease     20 min Neuromuscular Re-education:  [x]  See flow sheet : see below   Rationale: increase ROM, increase strength, improve coordination, improve balance and increase proprioception  to improve the patients ability to perform functional tasks with increased ease            With   [] TE   [] TA   [] neuro   [] other: Patient Education: [x] Review HEP    [] Progressed/Changed HEP based on:   [] positioning   [] body mechanics   [] transfers   [] heat/ice application    [] other:      Other Objective/Functional Measures:   Obstacle course with CGA of step ups, low hurdles, cone weaving, cone pickups and towel pick ups  Various chairs placed around gym and worked on pt sitting and standing with increased awareness of left side for sitting; pt tends to sit down sideways to chair increasing risk for falls  Various balance challenges performed on floor and foam (ball toss with therapist assist, cornhole bag toss, beach ball hit with 1# bar, shooting purple ball into moving hoop on the left)  Scavenger hunt around gym looking for colors with 75% assistance and cone hunt around gym with max cues to assist pt with finding cones of various colors  Pt with increased apprehension when attempting to go outside to walk so held on this activity  No difficulty with 6\" step up and overs; pt struggled with instruction to just perform step ups so performed simultaneously with step down as well    Pain Level (0-10 scale) post treatment: 0/10    ASSESSMENT/Changes in Function: Pt with improved ease of ambulation with decreased LOB but still appears to like some form of HHA. He is most at risk of falls due to left side neglect and potential quadrant loss noted by change in head position when trying to see various objects. He is apprehensive with outdoor ambulation and stair negotiation. He continues to demonstrate left neglect with attempt to perform various sit to stand transfers from different angles. Will continue with functional mobility and decreased fall risk to promote independence in the home and decrease caregiver burden. Patient will continue to benefit from skilled PT services to modify and progress therapeutic interventions, address functional mobility deficits, address ROM deficits, address strength deficits, analyze and address soft tissue restrictions, analyze and cue movement patterns, analyze and modify body mechanics/ergonomics, assess and modify postural abnormalities, address imbalance/dizziness and instruct in home and community integration to attain remaining goals. Progress towards goals / Updated goals:  Short Term Goals: To be accomplished in 1 weeks:              1. Patient and family will report compliance with initial HEP to optimize therapy outcomes. Met (4/12/22)  Long Term Goals:  To be accomplished in 4 weeks: 1. Patient will improve FOTO score by at least 5 points in order to demonstrate functional improvement. 2. Patient will report \"no difficulty\" with \"Walk[ing] around 1 level (floor) of your home\" with FOTO in order to demonstrate improved tolerance to daily tasks. 3. Patient will be able to perform sit to stand transfer with no more than mod I and min verbal cues to navigate home with increased ease. Continues to require max cues for left side awareness (4/15/22)              4. Patient will be able to ambulate at least 50 ft with no more than supervision and without LOB in order to navigate home with increased safety. 5. Patient will be able to ascend/descend at least 4\" step x3 in order to progress with stair navigation.  Met in // but pt apprehensive to perform consecutively on a staircase (4/15/22)    PLAN  [x]  Upgrade activities as tolerated     [x]  Continue plan of care  []  Update interventions per flow sheet       []  Discharge due to:_  []  Other:_      Lajuan Kussmaul, PTA 4/15/2022  8:55 AM      Future Appointments   Date Time Provider Janis Conteh   4/15/2022  1:30 PM Alma Solis HFSJPEX [facility]   4/15/2022  2:15 PM Seth Thompson PTA [facility]   4/18/2022 12:45 PM Lulu Recio, OTR/L MMCPTPB [facility]   4/18/2022  1:30 PM Farrah Mabry, PT NNOVEID [facility]   4/21/2022 12:45 PM Frarah Kusum Mccartneysmith, PT MMCPTPB [facility]   4/21/2022  1:30 PM Lulu Recio, OTR/L MMCPTPB [facility]   4/25/2022  1:30 PM Farrah Kusum Coppersmith, PT [id number] [facility]   4/25/2022  2:15 PM Lulu Recio, OTR/L MMCPTPB [facility]   4/28/2022 12:45 PM Farrah Mabry, PT MMCPTPB [facility]   4/28/2022  1:30 PM Lulu Recio, OTR/L MMCPTPB [facility]   8/18/2022  2:00 PM Vera Dixon, [address]

## 2022-04-15 NOTE — PROGRESS NOTES
OT DAILY TREATMENT NOTE 10-18    Patient Name: Tonja Joseph  Date:4/15/2022  : 1970  [x]  Patient  Verified  Payor: [employer] MEDICARE / Plan: [address] / Product Type: Managed Care Medicare /    In time:130  Out time:215  Total Treatment Time (min): 45  Visit #: 4 of 12    Medicare/BCBS Only   Total Timed Codes (min):  45 1:1 Treatment Time:  45     Treatment Area: Weakness of left upper extremity [R29.898]    SUBJECTIVE  Pain Level (0-10 scale): 0/10  Any medication changes, allergies to medications, adverse drug reactions, diagnosis change, or new procedure performed?: [x] No    [] Yes (see summary sheet for update)  Subjective functional status/changes:   [] No changes reported  Mother stated that she is trying to find way to make him look to the left. OBJECTIVE    30 min Therapeutic Exercise:  [] See flow sheet :   Rationale: increase ROM and increase strength to improve the patients ability to functionally use left hand. PROM Left UE 10x  Shrugs and retraction   AROM giving high fives 10x left hand     15 min Self Care/Home Management: ADLs   Rationale: increase ROM and improve coordination  to improve the patients ability to perform ADLs independently. Folded towels 4x with hand over hand assist.  Doffed jacket       With   [] TE   [] TA   [] neuro   [] other: Patient Education: [x] Review HEP    [] Progressed/Changed HEP based on:   [] positioning   [] body mechanics   [] transfers   [] heat/ice application   [] Splint wear/care   [] Sensory re-education   [] scar management      [] other:            Other Objective/Functional Measures:   When handed an item from left side pt uses that left hand more. Required assist from therapist to perform shrugs and retraction. Required multiple tactile and verbal cue to use left hand during all tasks. Beginning of session pt had 2 finger sublux, at the end of session decrease to 1.5 finger sublux. Pain Level (0-10 scale) post treatment: 0/10    ASSESSMENT/Changes in Function:   Improvement on folding with hand over hand assist.     Patient will continue to benefit from skilled OT services to modify and progress therapeutic interventions, address ROM deficits, address strength deficits, analyze and address soft tissue restrictions and analyze and cue movement patterns to attain remaining goals. [x]  See Plan of Care  []  See progress note/recertification  []  See Discharge Summary         Progress towards goals / Updated goals:  1. Mother will be independent in proper procedure for left UE ROM. Current Status (4/15/2022): initiated shrugs and retraction for sublux     2. Patient will be able to reach to place items with left hand with 50% accuracy to shoulder level. 4/4 progressing with cone stakcing and saebo tree  Current Status (4/15/2022): gave high fives with left hand    3. Patient will be supervision level for left hand strengthening. 4.  Patient will be supervision level for AAROM ( bimanual ) tasks     Long Term Goals: To be accomplished in 4 weeks:              1.  Patient will be able to don and doff all clothing with minimal assist for safety. 4/4 progressing with shoes and socks  2. Patient will be able to fold laundry using left hand assist.  4/12: verbal cueing required  3. Patient will achieve at least 100 degrees of left shoulder flexion to hang clothes in closet. 4   Patient will improve ability to use left hand as assist as shown by increase in FOTO of at least 10 points. PLAN  []  Upgrade activities as tolerated     [x]  Continue plan of care  []  Update interventions per flow sheet       []  Discharge due to:_  []  Other:_      AMERICA Shukla  4/15/2022  12:20 PM  I was present during the entire treatment, directing and participating in the treatment. Daniel Hart.  Johnathon Boudreaux        Future Appointments   Date Time Provider Janis Conteh   4/15/2022 1:30 PM Austen Cabrera [id number] [facility]   4/15/2022  2:15 PM Ryann Roe, PTA MMCPTPB [facility]   4/18/2022 12:45 PM Gerda Carlson, OTR/L MMCPTPB [facility]   4/18/2022  1:30 PM Farrah Calderon, PT [id number] [facility]   4/21/2022 12:45 PM Farrah Calderon, PT MMCPTPB [facility]   4/21/2022  1:30 PM Gerda Carlson, OTR/L MMCPTPB [facility]   4/25/2022  1:30 PM Farrah Calderon, PT [id number] [facility]   4/25/2022  2:15 PM Gerda Carlson, OTR/L MMCPTPB [facility]   4/28/2022 12:45 PM Farrah Calderon, PT MMCPTPB [facility]   4/28/2022  1:30 PM Gerda Carlson, OTR/L [id number] [facility]   8/18/2022  2:00 PM Pina Jay, [address]

## 2022-04-18 ENCOUNTER — HOSPITAL ENCOUNTER (OUTPATIENT)
Dept: PHYSICAL THERAPY | Age: 52
Discharge: HOME | End: 2022-04-18
Payer: MEDICARE

## 2022-04-18 PROCEDURE — 97530 THERAPEUTIC ACTIVITIES: CPT

## 2022-04-18 PROCEDURE — 97112 NEUROMUSCULAR REEDUCATION: CPT

## 2022-04-18 PROCEDURE — 97110 THERAPEUTIC EXERCISES: CPT

## 2022-04-18 PROCEDURE — 97535 SELF CARE MNGMENT TRAINING: CPT

## 2022-04-18 NOTE — PROGRESS NOTES
OT DAILY TREATMENT NOTE     Patient Name: Tonja Joseph  Date:2022  : 1970  [x]  Patient  Verified  Payor: [facility] MEDICARE / Plan: [employer] ID / Product Type: Managed Care Medicare /    In time:***  Out time:***  Total Treatment Time (min): ***  Visit #: *** of ***    Medicare/BCBS Only   Total Timed Codes (min):  *** 1:1 Treatment Time:  ***     Treatment Area: Weakness of left upper extremity [R29.898]    SUBJECTIVE  Pain Level (0-10 scale): ***  Any medication changes, allergies to medications, adverse drug reactions, diagnosis change, or new procedure performed?: [x] No    [] Yes (see summary sheet for update)  Subjective functional status/changes:   [] No changes reported  ***    OBJECTIVE    Modality rationale: {BSHSI INMOTION MODALITIES:88769} to improve the patients ability to ***   Min Type Additional Details    [] Estim:  []Unatt       []IFC  []Premod                        []Other:  []w/ice   []w/heat  Position:  Location:    [] Estim: []Att    []TENS instruct  []NMES                    []Other:  []w/US   []w/ice   []w/heat  Position:  Location:    []  Traction: [] Cervical       []Lumbar                       [] Prone          []Supine                       []Intermittent   []Continuous Lbs:  [] before manual  [] after manual    []  Ultrasound: []Continuous   [] Pulsed                           []1MHz   []3MHz W/cm2:  Location:    []  Iontophoresis with dexamethasone         Location: [] Take home patch   [] In clinic    []  Ice     []  heat  []  Ice massage  []  Laser   []  Paraffin Position:  Location:    []  Laser with stim  []  Other:  Position:  Location:    []  Vasopneumatic Device    []  Right     []  Left  Pre-treatment girth:  Post-treatment girth:  Measured at (location):  Pressure:       [] lo [] med [] hi   Temperature: [] lo [] med [] hi       [] Skin assessment post-treatment:  []intact []redness- no adverse reaction []redness  adverse reaction:     *** min []Eval                  []Re-Eval       *** min Therapeutic Exercise:  [] See flow sheet :   Rationale: {BSHSI IMMOTION THER EX:95606} to improve the patients ability to ***    *** min Therapeutic Activity:  []  See flow sheet :   Rationale: {BSHSI IMMOTION THER EX:13813}  to improve the patients ability to ***     *** min Neuromuscular Re-education:  []  See flow sheet :   Rationale: {BSHSI IMMOTION THER EX:68727}  to improve the patients ability to ***    *** min Manual Therapy:  ***   The manual therapy interventions were performed at a separate and distinct time from the therapeutic activities interventions. Rationale: {BSHSI IMMOTION MANUAL THERAPY:89661} to ***     min Orthotic/Splinting: ***   Rationale: {BSHSI IMMOTION THER EX:46825}  to improve the patients ability to ***    *** min Self Care/Home Management: ***   Rationale: {BSHSI IMMOTION THER EX:54020}  to improve the patients ability to ***    With   [] TE   [] TA   [] neuro   [] other: Patient Education: [x] Review HEP    [] Progressed/Changed HEP based on:   [] positioning   [] body mechanics   [] transfers   [] heat/ice application   [] Splint wear/care   [] Sensory re-education   [] scar management      [] other:            Other Objective/Functional Measures: ***     Pain Level (0-10 scale) post treatment: ***    ASSESSMENT/Changes in Function: ***    Patient will continue to benefit from skilled OT services to {[facility] INMOTION ASSESSMENT STATEMENTS:20159} to attain remaining goals. []  See Plan of Care  []  See progress note/recertification  []  See Discharge Summary         Progress towards goals / Updated goals:  1.  Mother will be independent in proper procedure for left UE ROM. Current Status (4/15/2022): initiated shrugs and retraction for sublux      2.  Patient will be able to reach to place items with left hand with 50% accuracy to shoulder level. 4/4 progressing with cone stakcing and saebo tree  Current Status (4/15/2022): gave high fives with left hand     3.  Patient will be supervision level for left hand strengthening. 4.  Patient will be supervision level for AAROM ( bimanual ) tasks     Long Term Goals: To be accomplished in 4 weeks:              1.  Patient will be able to don and doff all clothing with minimal assist for safety. 4/4 progressing with shoes and socks  2.  Patient will be able to fold laundry using left hand assist.  4/12: verbal cueing required  3.  Patient will achieve at least 100 degrees of left shoulder flexion to hang clothes in closet. 4   Patient will improve ability to use left hand as assist as shown by increase in FOTO of at least 10 points.     PLAN  []  Upgrade activities as tolerated     []  Continue plan of care  []  Update interventions per flow sheet       []  Discharge due to:_  []  Other:_      Aisha Pineda OTR/L 4/18/2022  12:45 PM    Future Appointments   Date Time Provider Janis Leijai   4/18/2022  1:30 PM Jacky Cranker, PTA MMCPTPB [facility]   4/21/2022 12:45 PM Farrah Nilda Greenew, PT MMCPTPB [facility]   4/21/2022  1:30 PM Eric Adame, OTR/L MMCPTPB [facility]   4/25/2022  1:30 PM Farrah Nilda Greenew, PT [id number] [facility]   4/25/2022  2:15 PM Eric Bullstefan, OTR/L MMCPTPB [facility]   4/28/2022 12:45 PM Farrah Nilda Jcw, PT MMCPTPB [facility]   4/28/2022  1:30 PM Eric Bullstefan, OTR/L ACBKATO [facility]   8/18/2022  2:00 PM Eduardo Morrison, [address]

## 2022-04-18 NOTE — PROGRESS NOTES
PT DAILY TREATMENT NOTE     Patient Name: Annetta Mccormack  Date:2022  : 1970  [x]  Patient  Verified  Payor: [facility] MEDICARE / Plan: [address] / Product Type: Managed Care Medicare /    In time:1:27   Out time: 2:15  Total Treatment Time (min): 48  Visit #: 6 of 12    Medicare/BCBS Only   Total Timed Codes (min): 48 1:1 Treatment Time: 48       Treatment Area: Lower extremity weakness [R29.898]  Abnormal gait [R26.9]    SUBJECTIVE  Pain Level (0-10 scale): \"yes\"  Any medication changes, allergies to medications, adverse drug reactions, diagnosis change, or new procedure performed?: [x] No    [] Yes (see summary sheet for update)  Subjective functional status/changes:   [] No changes reported  Spoke with mom and the patient used to be able to get up and down from the floor without problems. She notes improvement in ability to go up and down the stairs at home following him from behind.        OBJECTIVE     24 min Therapeutic Activity:  -  See flow sheet :  See below   Rationale: increase ROM, increase strength and increase proprioception  to improve the patients ability to perform functional tasks with increased ease     24 min Neuromuscular Re-education:  [x]  See flow sheet : see below   Rationale: increase ROM, increase strength, improve coordination, improve balance and increase proprioception  to improve the patients ability to perform functional tasks with increased ease            With   [] TE   [] TA   [] neuro   [] other: Patient Education: [x] Review HEP    [] Progressed/Changed HEP based on:   [] positioning   [] body mechanics   [] transfers   [] heat/ice application    [] other:      Other Objective/Functional Measures:     Various balance challenges performed on floor and foam (ball toss with therapist assist,  beach ball hit with 1# bar, shooting purple ball into moving hoop)  Scavenger hunt around gym looking for colors, placed cones high and low for scanning  Simulate sitting at desk with chair  Tried getting up off the floor from sitting on mat, but required dependent transfer secondary to fear for both directions from low mat table floor<>sit  Obstacle course with hurdles and cones  Dynamic standing with UE cone taps    Pain Level (0-10 scale) post treatment: verbalized \"yes\"    ASSESSMENT/Changes in Function:   Pt has improved with awareness of left UE. His ambulation has improved with less instances of LOB. He continues to show hesitancy with hurdles. He struggles to perform sit to stand from the floor secondary to fear requiring dependent transfer. Pt would benefit from continued left side scanning exercises and sitting to standing on floor to promote safe (I) transfers. Patient will continue to benefit from skilled PT services to modify and progress therapeutic interventions, address functional mobility deficits, address ROM deficits, address strength deficits, analyze and address soft tissue restrictions, analyze and cue movement patterns, analyze and modify body mechanics/ergonomics, assess and modify postural abnormalities, address imbalance/dizziness and instruct in home and community integration to attain remaining goals. Progress towards goals / Updated goals:  Short Term Goals: To be accomplished in 1 weeks:              1. Patient and family will report compliance with initial HEP to optimize therapy outcomes. Met (4/12/22)  Long Term Goals: To be accomplished in 4 weeks:              1. Patient will improve FOTO score by at least 5 points in order to demonstrate functional improvement. 2. Patient will report \"no difficulty\" with \"Walk[ing] around 1 level (floor) of your home\" with FOTO in order to demonstrate improved tolerance to daily tasks. 3. Patient will be able to perform sit to stand transfer with no more than mod I and min verbal cues to navigate home with increased ease. Continues to require max cues for left side awareness (4/15/22)              4. Patient will be able to ambulate at least 50 ft with no more than supervision and without LOB in order to navigate home with increased safety. 5. Patient will be able to ascend/descend at least 4\" step x3 in order to progress with stair navigation. Met in // but pt apprehensive to perform consecutively on a staircase (4/15/22)    PLAN  [x]  Upgrade activities as tolerated     [x]  Continue plan of care  []  Update interventions per flow sheet       []  Discharge due to:_  [x]  Other:_   work on [employer], [address] [place] [address] 4/18/2022  8:55 AM    I was present during the entire treatment, directing and participating in the treatment.    JER Mchugh       Future Appointments   Date Time Provider Department Center   4/18/2022  1:30 PM [facility]   4/21/2022 12:45 PM Farrah Pradhan, PT MMCPTPB [facility]   4/21/2022  1:30 PM Laurakiley Babb, OTR/L MMCPTPB [facility]   4/25/2022  1:30 PM Farrah Pradhan, PT OWBSUAB [facility]   4/25/2022  2:15 PM Laura Babb, OTR/L MMCPTPB [facility]   4/28/2022 12:45 PM Farrah Pradhan, PT MMCPTPB [facility]   4/28/2022  1:30 PM Laura Babb, OTR/L [id number] [facility]   8/18/2022  2:00 PM Ho Estes, [address]

## 2022-04-18 NOTE — PROGRESS NOTES
OT DAILY TREATMENT NOTE 10-18    Patient Name: Arely Nath  Date:2022  : 1970  [x]  Patient  Verified  Payor: [employer] MEDICARE / Plan: [employer] [address] / Product Type: Managed Care Medicare /    In time: 5458  Out time:130  Total Treatment Time (min): 45  Visit #: 5 of 12    Medicare/BCBS Only   Total Timed Codes (min):  45 1:1 Treatment Time:  45     Treatment Area: Weakness of left upper extremity [R29.898]    SUBJECTIVE  Pain Level (0-10 scale): 0/10  Any medication changes, allergies to medications, adverse drug reactions, diagnosis change, or new procedure performed?: [x] No    [] Yes (see summary sheet for update)  Subjective functional status/changes:   [] No changes reported  Mother stated that pt is having a great day. OBJECTIVE    22 min Therapeutic Exercise:  [] See flow sheet :   Rationale: increase ROM and increase strength to improve the patients ability to functionally use left hand    Shrugs and retraction 10x  Saebo tree levels 1-2 x 8 with left hand with oven mitt on right to re force use of left hand. AROM giving high fives 10x left hand    15 min Neuromuscular Re-education:  []  See flow sheet :   Rationale: increase ROM  to improve the patients ability to reach    Shoulder arc with left hand with mitt and tactile cues to use left hand and occasional hand over hand assist.      8 min Self Care/Home Management: ADLs   Rationale: increase ROM and improve coordination  to improve the patients ability to perform ADLs independently    Doff jacket with moderate assist.    With   [x] TE   [] TA   [] neuro   [] other: Patient Education: [x] Review HEP    [] Progressed/Changed HEP based on:   [] positioning   [x] body mechanics Education for caregiver on how to help pt with shoulder shrugs and retraction.   [] transfers   [] heat/ice application   [] Splint wear/care   [] Sensory re-education   [] scar management      [] other: Other Objective/Functional Measures:   By the end of session pt had 1.5 finger sublux    Requires multiple cue to use left hand during all tasks. Requires assist from therapist to perform shrugs and retractions. Pain Level (0-10 scale) post treatment: 0/10    ASSESSMENT/Changes in Function:   During shoulder arc task pt was not able to reach to place ring on opposite side. Decrease in sublux from previous appointment. Patient will continue to benefit from skilled OT services to modify and progress therapeutic interventions, address ROM deficits, address strength deficits, analyze and address soft tissue restrictions and analyze and cue movement patterns to attain remaining goals. []  See Plan of Care  []  See progress note/recertification  []  See Discharge Summary         Progress towards goals / Updated goals:  1. Mother will be independent in proper procedure for left UE ROM. Current Status (4/15/2022): initiated shrugs and retraction for sublux   Current Status (4/18/2022): educated mother on position for herself to help with shrugs and retraction       2. Patient will be able to reach to place items with left hand with 50% accuracy to shoulder level. 4/4 progressing with cone stakcing and saebo tree  Current Status (4/15/2022): gave high fives with left hand  Current Status (4/18/2022): Able to place rings on levels 1-2 saebo tree with minimal fatigue    3. Patient will be supervision level for left hand strengthening. 4.  Patient will be supervision level for AAROM ( bimanual ) tasks     Long Term Goals: To be accomplished in 4 weeks:              1.  Patient will be able to don and doff all clothing with minimal assist for safety. 4/4 progressing with shoes and socks  2. Patient will be able to fold laundry using left hand assist.  4/12: verbal cueing required  3. Patient will achieve at least 100 degrees of left shoulder flexion to hang clothes in closet.   4   Patient will improve ability to use left hand as assist as shown by increase in FOTO of at least 10 points. PLAN  []  Upgrade activities as tolerated     [x]  Continue plan of care  []  Update interventions per flow sheet       []  Discharge due to:_  []  Other:_      AMERICA Trimble  4/18/2022  3:38 PM  I was present during the entire treatment, directing and participating in the treatment. Janell Olmos.  Marianna Brambila    Future Appointments   Date Time Provider Janis Conteh   4/21/2022 12:45 PM Farrah Koch Art, [place] MMCPTPB [facility]   4/21/2022  1:30 PM Horris Poser, OTR/L MMCPTPB [facility]   4/25/2022  1:30 PM Sharee Gayle, PTA MMCPTPB [facility]   4/25/2022  2:15 PM Horris Poser, OTR/L MMCPTPB [facility]   4/28/2022 12:45 PM Farrah Douglasia Art, PT MMCPTPB [facility]   4/28/2022  1:30 PM Horris Poser, OTR/L MMCPTPB [facility]   8/18/2022  2:00 PM Sarah Juares, [address]

## 2022-04-21 ENCOUNTER — HOSPITAL ENCOUNTER (OUTPATIENT)
Dept: PHYSICAL THERAPY | Age: 52
Discharge: HOME | End: 2022-04-21
Payer: MEDICARE

## 2022-04-21 PROCEDURE — 97110 THERAPEUTIC EXERCISES: CPT

## 2022-04-21 PROCEDURE — 97535 SELF CARE MNGMENT TRAINING: CPT

## 2022-04-21 PROCEDURE — 97530 THERAPEUTIC ACTIVITIES: CPT

## 2022-04-21 PROCEDURE — 97112 NEUROMUSCULAR REEDUCATION: CPT

## 2022-04-21 NOTE — PROGRESS NOTES
PT DAILY TREATMENT NOTE     Patient Name: Carl Rachel  Date:2022  : 1970  [x]  Patient  Verified  Payor: [facility] MEDICARE / Plan: [address] / Product Type: Managed Care Medicare /    In time: 12:45P  Out time:1:35P  Total Treatment Time (min): 50  Visit #: 7 of 12    Medicare/BCBS Only   Total Timed Codes (min):  50 1:1 Treatment Time:  50       Treatment Area: Lower extremity weakness [R29.898]  Abnormal gait [R26.9]    SUBJECTIVE  Pain Level (0-10 scale): \"no\"  Any medication changes, allergies to medications, adverse drug reactions, diagnosis change, or new procedure performed?: [x] No    [] Yes (see summary sheet for update)  Subjective functional status/changes:   [] No changes reported      Patient's mother reports getting in and out of chairs is easier. Patient still has difficulty with getting in and out of bed.     OBJECTIVE      32 min Therapeutic Activity:  [x]  See flow sheet :   Rationale: increase ROM, increase strength, improve coordination, improve balance and increase proprioception  to improve the patients ability to perform daily tasks with increased ease     18 min Neuromuscular Re-education:  [x]  See flow sheet :   Rationale: increase ROM, increase strength, improve balance and increase proprioception  to improve the patients ability to perform daily tasks with increased ease              With   [] TE   [] TA   [] neuro   [] other: Patient Education: [x] Review HEP    [] Progressed/Changed HEP based on:   [] positioning   [] body mechanics   [] transfers   [] heat/ice application    [] other:      Other Objective/Functional Measures:     Patient able to ambulate outdoors with handheld assist (mother present)  Able to ambulate on gravel and grass with CGA at max  Increased step height with stepping up to ascend hill at base of incline  Patient able to step up on curb to ambulate on hilly grass around tree then became increasingly fearful and began leaning to right into therapist  Patient to navigate curb with only handheld assist and initial verbal cues to step over curb   Patient able to ambulate in clinic with standby to handheld assist  Patient attempts to sit in chairs turned at ankle  Ambulated in clinic and had patient to sit in various chairs; max verbal and tactile cues for sitting in chair with hips squared and directly in front of seat 2/5 times  Obstacle course-ascending/descending airex, low jennifer, stepping around glove box, pickup up/placing cones  Patient with LOB requiring mod A to navigate airex and low jennifer initially; able to perform with CGA following 1 rep of each and demonstration x3  Attempted sit to sidelying transfer-patient appeared confused and increasingly fearful  Instead performed reaching out of RAMO to therapist's hand placed at different positions (table raised so feet not on floor)  Difficulty reaching down towards left foot  Difficulty with reaching up on left and limited use of left UE during session  Patient instructed to stand at end of session and instead performed sit to supine  Able to get back to long sitting with min verbal cues but required max A/tactile cues to bring LEs to EOB  Three seated rest breaks with water during session  Max cueing for direction during session     Pain Level (0-10 scale) post treatment: \"yes\"    ASSESSMENT/Changes in Function: Patient demonstrates improved ability to clear obstacles this visit. He continues to require max cues for direction with activities most noted when activities involve moving to/turning to left side. Patient continues to remain fearful with bed mobility and ambulation on uneven surfaces requiring increased assistance for safety at times. Patient also continues to require at least standby assist for ambulation in clinic due to left neglect/decreased environmental awareness.      Patient will continue to benefit from skilled PT services to modify and progress therapeutic interventions, address functional mobility deficits, address ROM deficits, address strength deficits, analyze and address soft tissue restrictions, analyze and cue movement patterns, analyze and modify body mechanics/ergonomics, assess and modify postural abnormalities, address imbalance/dizziness and instruct in home and community integration to attain remaining goals. Progress towards goals / Updated goals:  Short Term Goals: To be accomplished in 1 weeks:              1. Patient and family will report compliance with initial HEP to optimize therapy outcomes. Met (4/12/22)  Long Term Goals: To be accomplished in 4 weeks:              1. Patient will improve FOTO score by at least 5 points in order to demonstrate functional improvement.               2. Patient will report \"no difficulty\" with \"Walk[ing] around 1 level (floor) of your home\" with FOTO in order to demonstrate improved tolerance to daily tasks.              3. Patient will be able to perform sit to stand transfer with no more than mod I and min verbal cues to navigate home with increased ease. Continues to require max cues for left side awareness (4/15/22)              4. Patient will be able to ambulate at least 50 ft with no more than supervision and without LOB in order to navigate home with increased safety. Progressing-stand by assist for direction (4/21/22)              5. Patient will be able to ascend/descend at least 4\" step x3 in order to progress with stair navigation.  Met in // but pt apprehensive to perform consecutively on a staircase (4/15/22)    PLAN  [x]  Upgrade activities as tolerated     [x]  Continue plan of care  []  Update interventions per flow sheet       []  Discharge due to:_  []  Other:_      Glenda Ortiz, PT 4/21/2022  10:22 AM    Future Appointments   Date Time Provider Janis Conteh   4/21/2022 12:45 PM Hudgins Carma Bence, PT [facility]   4/21/2022  1:30 PM Ryder Shen, OTR/L [facility]   4/25/2022  1:30 PM Zoey Aleman, PTA [facility]   4/25/2022  2:15 PM Ryder Shen, OTR/L [facility]   4/28/2022 12:45 PM Farrah Martins, PT [facility]   4/28/2022  1:30 PM Ryder Shen, OTR/L [facility]   8/18/2022  2:00 PM Lisette Vidal, [address]

## 2022-04-21 NOTE — PROGRESS NOTES
OT DAILY TREATMENT NOTE     Patient Name: Pardeep Villanueva  Date:2022  : 1970  [x]  Patient  Verified  Payor: [place] [facility] MEDIC[facility] / Plan: [address] / Product Type: Managed Care Medicare /    In time:130  Out time:215  Total Treatment Time (min): 45  Visit #: 6 of 12    Medicare/BCBS Only   Total Timed Codes (min):  45 1:1 Treatment Time:  45     Treatment Area: Weakness of left upper extremity [R29.898]    SUBJECTIVE  Pain Level (0-10 scale): 0/10  Any medication changes, allergies to medications, adverse drug reactions, diagnosis change, or new procedure performed?: [x] No    [] Yes (see summary sheet for update)  Subjective functional status/changes:   [] No changes reported  No indication of pain    OBJECTIVE        15 min Therapeutic Exercise:  [] See flow sheet :   Rationale: increase ROM to improve the patients ability to reach  BUE 1# dowel reach to hit ball  PROm left shoulder  AAROM left shoulder with sacpula mobilization    15 min Therapeutic Activity:  []  See flow sheet :   Rationale: increase ROM and improve coordination  to improve the patients ability to use left UE    Left hand cone stack from right to left side with constraint on right hand             15 min Self Care/Home Management: home care, toileting   Rationale: increase ROM and improve coordination  to improve the patients ability to use left hand  Pulling up tband over hips to simulate pulling up pants with both hands  Folding sock, shirt, towel with left assist with OT providing demonstration ofr hand over hand    With   [] TE   [] TA   [] neuro   [] other: Patient Education: [x] Review HEP    [] Progressed/Changed HEP based on: use of left to help  [] positioning   [] body mechanics   [] transfers   [] heat/ice application   [] Splint wear/care   [] Sensory re-education   [] scar management      [] other:            Other Objective/Functional Measures:    Able to complete folding with hand over hand assist  Pulled up t band well over hips    Pain Level (0-10 scale) post treatment: 0/10    ASSESSMENT/Changes in Function: ongoing cueing  To use left hand at times    Patient will continue to benefit from skilled OT services to modify and progress therapeutic interventions, address ROM deficits, address strength deficits, analyze and address soft tissue restrictions, analyze and cue movement patterns and instruct in home and community integration to attain remaining goals. []  See Plan of Care  []  See progress note/recertification  []  See Discharge Summary         Progress towards goals / Updated goals:  1.  Mother will be independent in proper procedure for left UE ROM. Current Status (4/15/2022): initiated shrugs and retraction for sublux   Current Status (4/18/2022): educated mother on position for herself to help with shrugs and retraction        2.  Patient will be able to reach to place items with left hand with 50% accuracy to shoulder level. 4/4 progressing with cone stakcing and saebo tree  Current Status (4/15/2022): gave high fives with left hand  Current Status (4/18/2022): Able to place rings on levels 1-2 saebo tree with minimal fatigue     3.  Patient will be supervision level for left hand strengthening. 4.  Patient will be supervision level for AAROM ( bimanual ) tasks  4/21 ongoing cues to use left hand     Long Term Goals: To be accomplished in 4 weeks:              1.  Patient will be able to don and doff all clothing with minimal assist for safety. 4/4 progressing with shoes and socks  2.  Patient will be able to fold laundry using left hand assist.  4/12: verbal cueing required    3.  Patient will achieve at least 100 degrees of left shoulder flexion to hang clothes in closet.   4   Patient will improve ability to use left hand as assist as shown by increase in FOTO of at least 10 points    PLAN  []  Upgrade activities as tolerated     []  Continue plan of care  []  Update interventions per flow sheet       []  Discharge due to:_  []  Other:_      Duy Bravo, OTR/L 4/21/2022  10:28 AM    Future Appointments   Date Time Provider Janis Conteh   4/21/2022 12:45 PM Farrah Krause, PT [id number]   4/21/2022  1:30 PM Raysophia Halo, OTR/L [facility]   4/25/2022  1:30 PM Ashutosh Guerin, PTA [facility]   4/25/2022  2:15 PM Raysophia Halo, OTR/L [facility]   4/28/2022 12:45 PM Farrah Rivera Dye, PT [facility]   4/28/2022  1:30 PM Sobia Halo, OTR/L [facility]   8/18/2022  2:00 PM Shahrzad Robles, [address]

## 2022-04-25 ENCOUNTER — HOSPITAL ENCOUNTER (OUTPATIENT)
Dept: PHYSICAL THERAPY | Age: 52
Discharge: HOME | End: 2022-04-25
Payer: MEDICARE

## 2022-04-25 PROCEDURE — 97535 SELF CARE MNGMENT TRAINING: CPT

## 2022-04-25 PROCEDURE — 97530 THERAPEUTIC ACTIVITIES: CPT

## 2022-04-25 PROCEDURE — 97110 THERAPEUTIC EXERCISES: CPT

## 2022-04-25 PROCEDURE — 97112 NEUROMUSCULAR REEDUCATION: CPT

## 2022-04-25 NOTE — PROGRESS NOTES
OT DAILY TREATMENT NOTE     Patient Name: Carl Rachel  Date:2022  : 1970  [x]  Patient  Verified  Payor: [place] [employer]IC[facility] / Plan: [address] / Product Type: Managed Care Medicare /    In time:220  Out time:300  Total Treatment Time (min): 40  Visit #: 7 of 12    Medicare/BCBS Only   Total Timed Codes (min):  40 1:1 Treatment Time:  40     Treatment Area: Weakness of left upper extremity [R29.898]    SUBJECTIVE  Pain Level (0-10 scale): 0/10  Any medication changes, allergies to medications, adverse drug reactions, diagnosis change, or new procedure performed?: [x] No    [] Yes (see summary sheet for update)  Subjective functional status/changes:   [] No changes reported  [facility]  Mom reports still difficulty using hand to pull pants up    OBJECTIVE        15 min Therapeutic Activity:  []  See flow sheet :   Rationale: increase ROM and improve coordination  to improve the patients ability to fold clothes  Folding shirt, towel with demonstration and cues to incorporate left hand       25 min Self Care/Home Management: dressing   Rationale: increase strength and improve coordination  to improve the patients ability to dress self  Removed both shoes   Donned shorts over feet and pulled to hips  Removed shorts pulling down over hips,   Donned shoes on feet    With   [] TE   [] TA   [] neuro   [] other: Patient Education: [x] Review HEP    [] Progressed/Changed HEP based on:   [] positioning   [] body mechanics   [] transfers   [] heat/ice application   [] Splint wear/care   [] Sensory re-education   [] scar management      [] other:            Other Objective/Functional Measures:   Able to don shorts with increased time and supervision  Able to don and doff shoes (not tie) with supervision   Intermittent cues ot use left hand    Pain Level (0-10 scale) post treatment: *0/10**    ASSESSMENT/Changes in Function: improving self care skills    Patient will continue to benefit from skilled OT services to modify and progress therapeutic interventions, address ROM deficits, address strength deficits, analyze and cue movement patterns and instruct in home and community integration to attain remaining goals. []  See Plan of Care  []  See progress note/recertification  []  See Discharge Summary         Progress towards goals / Updated goals:  1.  Mother will be independent in proper procedure for left UE ROM. Current Status (4/15/2022): initiated shrugs and retraction for sublux   Current Status (4/18/2022): educated mother on position for herself to help with shrugs and retraction        2.  Patient will be able to reach to place items with left hand with 50% accuracy to shoulder level. 4/4 progressing with cone stakcing and saebo tree  Current Status (4/15/2022): gave high fives with left hand  Current Status (4/18/2022): Able to place rings on levels 1-2 saebo tree with minimal fatigue     3.  Patient will be supervision level for left hand strengthening.  4/25: unable to get patient to use putty with left hand    4.  Patient will be supervision level for AAROM ( bimanual ) tasks  4/21 ongoing cues to use left hand     Long Term Goals: To be accomplished in 4 weeks:              1.  Patient will be able to don and doff all clothing with minimal assist for safety. 4/4 progressing with shoes and socks  4/25 able to don and doff shorts with supervision  4/25 able to don and doff shoes with supervision  2. Phuong Perfect will be able to fold laundry using left hand assist.  4/12: verbal cueing required  4/25 hand over hand or cueing required    3.  Patient will achieve at least 100 degrees of left shoulder flexion to hang clothes in closet.   4   Patient will improve ability to use left hand as assist as shown by increase in FOTO of at least 10 points  PLAN  []  Upgrade activities as tolerated     []  Continue plan of care  []  Update interventions per flow sheet       [] Discharge due to:_  []  Other:_      Brenda Loera, OTR/L 4/25/2022  8:27 AM    Future Appointments   Date Time Provider Janis Yady   4/25/2022  1:30 PM Kaitlyn Lua [facility]   4/25/2022  2:15 PM Lorabta Pester, OTR/L [facility]   4/28/2022 12:45 PM Farrah Walton, PT [facility]   4/28/2022  1:30 PM Lorretta Pester, OTR/L [facility]   8/18/2022  2:00 PM Alem Padron PA-C [id number]

## 2022-04-25 NOTE — PROGRESS NOTES
PT DAILY TREATMENT NOTE     Patient Name: Miquel Rodriguez  Date:2022  : 1970  [x]  Patient  Verified  Payor: [employer] MEDICARE / Plan: [address] / Product Type: Managed Care Medicare /    In time:1:40   Out time: 2:10  Total Treatment Time (min): 30  Visit #: 8 of 12    Medicare/BCBS Only   Total Timed Codes (min): 30 1:1 Treatment Time: 30       Treatment Area: Lower extremity weakness [R29.898]  Abnormal gait [R26.9]    SUBJECTIVE  Pain Level (0-10 scale): \"no\"  Any medication changes, allergies to medications, adverse drug reactions, diagnosis change, or new procedure performed?: [x] No    [] Yes (see summary sheet for update)  Subjective functional status/changes:   [] No changes reported  Mother reports Rosalina Rikki holds onto her or therapists following stroke out of fear. She states she is glad he was approved for another month of visits for therapy. OBJECTIVE        30 min Neuromuscular Re-education:  [x]  See flow sheet : see below   Rationale: increase ROM, increase strength, improve coordination, improve balance and increase proprioception  to improve the patients ability to perform functional tasks with increased ease            With   [] TE   [] TA   [] neuro   [] other: Patient Education: [x] Review HEP    [] Progressed/Changed HEP based on:   [] positioning   [] body mechanics   [] transfers   [] heat/ice application    [] other:      Other Objective/Functional Measures:     Challenged with side sit to tall kneel on low mat with max assist to progress to quadruped for floor to stand transfers  Performed short step lunges in // bars  Instructed on ball roll out on low mat to touching toes to assist with lowering to floor  Practiced weight shifts in side sitting with cone stacking    Pain Level (0-10 scale) post treatment: verbalized \"no\"    ASSESSMENT/Changes in Function:   Pt is making slow but steady progress towards goals.   He is able to ambulate (I) with a slow, shuffling gait with supervision however out of fear will sometimes reach out to hold therapist for stability. Pt would benefit from continued left side scanning exercises and quadruped positioning on low mat to promote safe (I) transfers. Patient will continue to benefit from skilled PT services to modify and progress therapeutic interventions, address functional mobility deficits, address ROM deficits, address strength deficits, analyze and address soft tissue restrictions, analyze and cue movement patterns, analyze and modify body mechanics/ergonomics, assess and modify postural abnormalities, address imbalance/dizziness and instruct in home and community integration to attain remaining goals. Progress towards goals / Updated goals:  Short Term Goals: To be accomplished in 1 weeks:              1. Patient and family will report compliance with initial HEP to optimize therapy outcomes. Met (4/12/22)  Long Term Goals: To be accomplished in 4 weeks:              1. Patient will improve FOTO score by at least 5 points in order to demonstrate functional improvement. 2. Patient will report \"no difficulty\" with \"Walk[ing] around 1 level (floor) of your home\" with FOTO in order to demonstrate improved tolerance to daily tasks. 3. Patient will be able to perform sit to stand transfer with no more than mod I and min verbal cues to navigate home with increased ease. Continues to require max cues for left side awareness (4/15/22)              4. Patient will be able to ambulate at least 50 ft with no more than supervision and without LOB in order to navigate home with increased safety. 5. Patient will be able to ascend/descend at least 4\" step x3 in order to progress with stair navigation.  Met in // but pt apprehensive to perform consecutively on a staircase (4/15/22)    PLAN  [x]  Upgrade activities as tolerated     [x]  Continue plan of care  []  Update interventions per flow sheet       []  Discharge due to:_  []  Other:_       JONAH Castellano 4/25/2022  8:55 AM    I was present during the entire treatment, directing and participating in the treatment.    Ruby Muhammad, LPTA       Future Appointments   Date Time Provider Janis Conteh   4/25/2022  1:30 PM Tangela Alba [facility]   4/25/2022  2:15 PM Laura Babb, OTR/L [facility]   4/28/2022 12:45 PM Farrah Pradhan, PT [facility]   4/28/2022  1:30 PM Laura Babb, OTR/L [facility]   8/18/2022  2:00 PM Ho Estes, [address]

## 2022-04-26 NOTE — PROGRESS NOTES
[facility] - [facility]  [address] [facility]  [phone] fax    Continued Plan of Care/ Re-certification for Occupational Therapy Services    Patient name: Magnus Emmanuel Start of Care: 3/28/22   Referral source: Deborrah Sports : 1970   Medical/Treatment Diagnosis: Weakness of left upper extremity [R29.898]  Payor: [address] [place] [address],9D / Plan: [address] / Product Type: [employer] Care Medicare /  Onset Date:21     Prior Hospitalization: see medical history Provider#: [id number]   Medications: Verified on Patient Summary List    Comorbidities: Downs syndrome, aortic valve replacement, corneal transplant, colon   Prior Level of Function: Independent self care, helped fold laundry, put away groceries, could make sandwich                                                                        Visits from Start of Care: 7    Missed Visits: 0    The Plan of Care and following information is based on the patient's current status:    1.  Mother will be independent in proper procedure for left UE ROM. Eval status: Initiated  Current status; further review required,  initiated shrugs and retraction for sublux ,  educated mother on position for herself to help with shrugs and retraction      2.  Patient will be able to reach to place items with left hand with 50% accuracy to shoulder level  Eval status; unable to reach  Current status: Met     3.  Patient will be supervision level for left hand strengthening. Eval status; needed activity, HEP  Current status: ongoing,  unable to get patient to use putty with left hand     4.  Patient will be supervision level for AAROM ( bimanual ) tasks  Eval status; constant cues to use left, constraint induced movement techniques required  Current status: Progressing, inconsistent  cues needed to use left hand    Long Term Goals: To be accomplished in 4 weeks:              1.  Patient will be able to don and doff all clothing with minimal assist for safety. Eval status; REquired max assist  Current status: progressing, able to don and doff shorts with supervision,able to don and doff shoes with supervision    2. Jessica Dorsey will be able to fold laundry using left hand assist.  Eval status; Unable  Current status; progressing, cueing and  hand over hand assist required     3.  Patient will achieve at least 100 degrees of left shoulder flexion to hang clothes in closet. 4   Patient will improve ability to use left hand as assist as shown by increase in FOTO   Eval status FOTO 48  Current status: Not reassessed    Key functional changes: Improvement in use of left hand, self care skills      Problems/ barriers to goal attainment: cognitive impairment, visual field      Treatment Plan: Therapeutic exercise, Therapeutic activities, Neuromuscular re-education, Patient education and ADLs/IADLs      Patient Goal (s) has been updated and includes: More independent     Goals for this certification period to be accomplished in 4 weeks:  1.  Mother will be independent in proper procedure for left UE ROM. Current status; further review required,  initiated shrugs and retraction for subluxation ,  educated mother on position for herself to help with shrugs and retraction    3.  Patient will be supervision level for left hand strengthening. Current status: ongoing,  unable to get patient to use putty with left hand    4.  Patient will be supervision level for AAROM ( bimanual ) tasks  Current status: Progressing, inconsistent  cues needed to use left hand    Long Term Goals: To be accomplished in 4 weeks:              1.  Patient will be able to don and doff all clothing with minimal assist for safety.   Current status: progressing, able to don and doff shorts with supervision,able to don and doff shoes with supervision    2. Jessica Dorsey will be able to fold laundry using left hand assist.  Current status; progressing, cueing and  hand over hand assist required    3.  Patient will achieve at least 100 degrees of left shoulder flexion to hang clothes in closet. Current status; not reassessed    4   Patient will improve ability to use left hand as assist as shown by increase in FOTO   Current status: Not reassessed    Frequency / Duration: Patient to be seen 2 times per week for 4 weeks:    Assessment / Karl Olson will benefit from continued skilled occupational therapy to increase upper extremity function and functional independence. Certification Period: 4/28/22-5/27/22    Shaun Burkitt, OTR/L 4/26/2022 2:17 PM    ________________________________________________________________________  I certify that the above Therapy Services are being furnished while the patient is under my care. I agree with the treatment plan and certify that this therapy is necessary.       [address] [place] [address] Signature:____________Date:_________TIME:________     Keven Lisa PA-C  ** Signature, Date and Time must be completed for valid certification **      Please sign and return to [facility] - [facility]  [address] [facility]            [phone] fax

## 2022-04-28 ENCOUNTER — HOSPITAL ENCOUNTER (OUTPATIENT)
Dept: PHYSICAL THERAPY | Age: 52
Discharge: HOME | End: 2022-04-28
Payer: MEDICARE

## 2022-04-28 ENCOUNTER — APPOINTMENT (OUTPATIENT)
Dept: PHYSICAL THERAPY | Age: 52
End: 2022-04-28
Payer: MEDICARE

## 2022-04-28 PROCEDURE — 97530 THERAPEUTIC ACTIVITIES: CPT

## 2022-04-28 NOTE — PROGRESS NOTES
[facility] - [facility]  JADYN  [address] [place] [address]  [phone] fax    Continued Plan of Care/ Re-certification for Physical Therapy Services    Patient name: Nando Nesbitt Start of Care: 3/31/2022   Referral source: Heri Brewer : 1970   Medical/Treatment Diagnosis: Lower extremity weakness [R29.898]  Abnormal gait [R26.9]  Payor: [employer] MEDICARE / Plan: [address] / Product Type: Managed Care Medicare /  Onset Date:2021     Prior Hospitalization: see medical history Provider#: [id number]   Medications: Verified on Patient Summary List    Comorbidities: Down syndrome, aortic valve replacement, corneal transplant, colon resection, right CVA  Prior Level of Function:lives in 1 story home with family (mother, father, and sister-caregiver)                             Visits from Start of Care: 9    Missed Visits: 0    The Plan of Care and following information is based on the patient's current status:  Goal: Patient and family will report compliance with initial HEP to optimize therapy outcomes. Status at last note/certification: New goal  Current Status: MET    Goal:  Patient will improve FOTO score by at least 5 points in order to demonstrate functional improvement. Status at last note/certification: New PBOD-  Current Status: Progressing-58/100    Goal: Patient will report \"no difficulty\" with \"Walk[ing] around 1 level (floor) of your home\" with FOTO in order to demonstrate improved tolerance to daily tasks. Status at last note/certification: New goal-\"little difficulty\"  Current Status: Progressing-\"New goal-\"little difficulty\"    Goal: Patient will be able to perform sit to stand transfer with no more than mod I and min verbal cues to navigate home with increased ease.   Status at last note/certification: New goal-supervision and max verbal cues  Current Status: Progressing-mod verbal cues    Goal: Patient will be able to ambulate at least 50 ft with no more than supervision and without LOB in order to navigate home with increased safety. Status at last note/certification: Progressing-decreased safety awareness and CGA  Current Status: MET    Goal: Patient will be able to ascend/descend at least 4\" step x3 in order to progress with stair navigation. Status at last note/certification: New goal-mother reports difficulty, unable to assess  Current Status: Progressing-mother reports standby to [address] with use of right handrail with reciprocal pattern to ascend and step to pattern to descend; at time of therapy visit: able to ascend forwards with reciprocal pattern and right handrail with CGA; descend facing left handrail using BUEs with step to pattern with CGA to mod A to prevent falling      Key functional changes: overall subjective improvement reported by mother/caregiver, increased FOTO score, increased independence with ambulation and ADLs,     Problems/ barriers to goal attainment: cognitive deficits, decreased left sided awareness    Problem List: pain affecting function, decrease ROM, decrease strength, impaired gait/ balance, decrease ADL/ functional abilitiies, decrease activity tolerance, decrease flexibility/ joint mobility and decrease transfer abilities    Treatment Plan: Therapeutic exercise, Therapeutic activities, Neuromuscular re-education, Physical agent/modality, Gait/balance training, Manual therapy, Patient education, Self Care training, Functional mobility training, Home safety training and Stair training     Patient Goal (s) has been updated and includes: \"to be more independent:     Goals for this certification period to be accomplished in 4 weeks:   1. Patient will improve FOTO score by at least 5 points in order to demonstrate functional improvement.                2. Patient will report \"no difficulty\" with \"Walk[ing] around 1 level (floor) of your home\" with FOTO in order to demonstrate improved tolerance to daily tasks. 3. Patient will navigate at least 4 stairs with no more than right handrail and supervision in order to navigate home with increase ease. 4. Patient will be able to perform floor transfer with no more than mod A in order to perform ADLs with increased ease. Frequency / Duration: Patient to be seen 2 times per week for 4 weeks:    Assessment / Recommendations: Patient making slow, steady progress towards goals with therapy. Patient's mother reports increased independence with ambulation in the home and placing dishes in the sink. She reports he can enter/exit car with decreased assistance but has limited awareness of donning/doffing seatbelt. Patient has been able to navigate stairs with use of right handrail only with only standby to [facility] at home. Patient continues to sit with right quarter turn limiting safety with sitting as he will sit on the armrest instead of in chair. Mother reports increased fatigue with ambulating to car. Patient continues to ambulate with wide RAMO and decreased foot clearance and decreased speed but has been able to progress to ambulation with no more than supervision. Patient max A for side sitting to tall kneeling in preparation for achieving quadruped to assist with floor transfers. Patient with increased fear with navigating unfamiliar areas/performing unfamiliar tasks leading to increased fear/apprehension with navigating stairs in clinic and need to descend facing handrail with increased assistance. Patient has been able to progress with outdoor ambulation (gravel, grass, sidewalk) with mother present with handheld to [facility]. Patient with decreased safety awareness and increased instability leading to increased assistance required by therapist when performing more challenging tasks.  He continues to require max cues for direction with activities most noted when activities involving moving to/turning to left side due to left neglect and potential quadrant loss noted by change in head position when trying to see various objects. Patient continues to require increased assistance to navigate hurdles, narrow pathways, and uneven surfaces in clinic. Patient would benefit from continued skilled outpatient PT to address remaining deficits and improve overall QOL. Certification Period: 4/29/22-5/28/22    Estela Casarez, PT 4/28/2022 9:41 AM    ________________________________________________________________________  I certify that the above Therapy Services are being furnished while the patient is under my care. I agree with the treatment plan and certify that this therapy is necessary. [] I have read the above and request that my patient continue as recommended.   [] I have read the above report and request that my patient continue therapy with the following changes/special instructions: _______________________________________  [] I have read the above report and request that my patient be discharged from therapy    Physician's Signature:____________Date:_________TIME:________     Perez Livingston PA-C  ** Signature, Date and Time must be completed for valid certification **    Please sign and return to [facility] JADYN  [address] [place] [address]  [phone] fax

## 2022-04-28 NOTE — PROGRESS NOTES
PT DAILY TREATMENT NOTE     Patient Name: Yuniel Velasquez  Date:2022  : 1970  [x]  Patient  Verified  Payor: [facility] MEDICARE / Plan: [address] / Product Type: Managed Care Medicare /    In time:12:53P  Out time:1:27P  Total Treatment Time (min): 34  Visit #: 9 of 12    Medicare/BCBS Only   Total Timed Codes (min):  34 1:1 Treatment Time:  34       Treatment Area: Lower extremity weakness [R29.898]  Abnormal gait [R26.9]    SUBJECTIVE  Pain Level (0-10 scale): \"yes\"  Any medication changes, allergies to medications, adverse drug reactions, diagnosis change, or new procedure performed?: [x] No    [] Yes (see summary sheet for update)  Subjective functional status/changes:   [] No changes reported      Patient's mother reports he is more independent with walking to the table to eat and is eating by himself. He can get up and take the dishes to the sink. He is better with the stairs; he still goes down 1 step at a time but can go step over step going up holding onto right handrail. He still sits with quarter turn to right and will sit on the arm of the chair at home. He needs to work on getting in and out of the car as it is a longer distance. One day he was able to put his seatbelt on by himself but that only happened 1 day. He is still not getting up and down from the floor. OBJECTIVE      34 min Therapeutic Activity:  []  See flow sheet :   Rationale: increase ROM, increase strength, improve coordination, improve balance and increase proprioception  to improve the patients ability to perform ADLs with increased ease          With   [] TE   [] TA   [] neuro   [] other: Patient Education: [x] Review HEP    [] Progressed/Changed HEP based on:   [] positioning   [] body mechanics   [] transfers   [] heat/ice application    [] other:      Other Objective/Functional Measures:      Mod to max verbal cues for sitting without sitting at quarter turn  Able to ambulate 3/4 of the way in clinic with SBA and remaining 50 ft with only supervision when directed to walk towards and objects  Patient ambulates with wide RAMO and shuffled steps  Fearful of navigating stairs but able to ascend with reciprocal pattern and right handrail and standby assist to CGA  Upon reaching top of steps patient with increased respiration rate and attempts to lean back into therapist  Patient required max verbal cues and reassurance/encouragement to turn to to right to descend stairs and tactile cues to place left hand on right handrail to initiate turn  Patient cued to complete full turn to descend but began to descend sideways facing and using right handrail  Low hurdles-handheld assist and max visual cues/demonstration-patient will attempt to step around, circumduct or step over object  Stepping between objects placed close together-max cues to turn and sidestep to reach target to complete tasks  Stepping onto foam/4\" step-improved stability with 2nd rep with more even foot placement/weight shift      FOTO=58  FOTO completed by mother    Pain Level (0-10 scale) post treatment: \"no\"    ASSESSMENT/Changes in Function: SEE RECERTIFICATION    Patient will continue to benefit from skilled PT services to modify and progress therapeutic interventions, address functional mobility deficits, address ROM deficits, address strength deficits, analyze and address soft tissue restrictions, analyze and cue movement patterns, analyze and modify body mechanics/ergonomics, assess and modify postural abnormalities, address imbalance/dizziness and instruct in home and community integration to attain remaining goals. []  See Plan of Care  [x]  See progress note/recertification  []  See Discharge Summary         Progress towards goals / Updated goals:    SEE RECERTIFICATION FOR UPDATED [address] be accomplished in 1 weeks:              1.  Patient and family will report compliance with initial HEP to optimize therapy outcomes. Met (4/12/22)  Long Term Goals: To be accomplished in 4 weeks:              1. Patient will improve FOTO score by at least 5 points in order to demonstrate functional improvement. Progressing-58/100              2. Patient will report \"no difficulty\" with \"Walk[ing] around 1 level (floor) of your home\" with FOTO in order to demonstrate improved tolerance to daily tasks. Progressing-\"little difficulty\"              4. Patient will be able to perform sit to stand transfer with no more than mod I and min verbal cues to navigate home with increased ease. Continues to require max cues for left side awareness (4/15/22)               4. Patient will be able to ambulate at least 50 ft with no more than supervision and without LOB in order to navigate home with increased safety. MET-4/28/22              5. Patient will be able to ascend/descend at least 4\" step x3 in order to progress with stair navigation.  Met in // but pt apprehensive to perform consecutively on a staircase (4/15/22)  Progrssing-able to ascend forwards with reciprocal pattern and righ thandrail; descend facing left handrail using BUEs with step to pattern 4/28/22    PLAN  [x]  Upgrade activities as tolerated     [x]  Continue plan of care  []  Update interventions per flow sheet       []  Discharge due to:_  []  Other:_      Davon Harden, PT 4/28/2022  9:39 AM    Future Appointments   Date Time Provider Janis Conteh   4/28/2022 12:45 PM [facility], PT MMCPTPB [facility]   4/28/2022  1:30 PM Tacos Varela, OTR/L MMCPTPB [facility]   5/4/2022  2:15 PM Early Gabriela POPSVSG [facility]   5/4/2022  3:00 PM Sole Watkins, PTA [facility]CP[facility]   5/6/2022 12:45 PM Kevin Ledbetter, PTA [facility]   5/6/2022  1:30 PM Early Bible [id number] [facility]   5/9/2022  1:30 PM Early Bible HFVOCVA [facility]   5/9/2022  2:15 PM Thora Cava, PTA [facility]CPTP[facility]   5/12/2022  1:30 PM Early Bible DNIKOYV [facility]   5/12/2022 2:15 PM Velna Cooks, PTA [facility]CPTP[facility]   5/16/2022  1:30 PM Willye Pierre RELYUPL [facility]   5/16/2022  2:15 PM Velna Cooks, PTA [facility]CPTP[facility]   5/18/2022  1:30 PM Violet Foots, OTR/L [facility]CPTPB [facility]   5/18/2022  2:15 PM Hudgins Carma Bence, PT MMCPTPB [facility]   5/23/2022  1:30 PM Hudgins Carma Bence, [place] MMCPTPB [facility]   5/23/2022  2:15 PM Willye Pierre XJHRJQB [facility]   5/26/2022  1:30 PM Willye Pierre AGLHNTO [facility]   5/26/2022  2:15 PM Hudgins Carma Bence, PT MMCPTPB [facility]   5/31/2022  1:30 PM Willye Pierre AOSFNHP [facility]   5/31/2022  2:15 PM Velna Cooks, PTA MMCPTPB [facility]   6/2/2022  1:30 PM Velna Cooks, PTA MMCPTPB [facility]   6/2/2022  2:15 PM Willye Pierre WBCBKQP [facility]   8/18/2022  2:00 PM Tania Cooper

## 2022-05-04 ENCOUNTER — HOSPITAL ENCOUNTER (OUTPATIENT)
Dept: PHYSICAL THERAPY | Age: 52
Discharge: HOME | End: 2022-05-04
Payer: MEDICARE

## 2022-05-04 ENCOUNTER — HOSPITAL ENCOUNTER (OUTPATIENT)
Dept: PHYSICAL THERAPY | Age: 52
End: 2022-05-04
Payer: MEDICARE

## 2022-05-04 PROCEDURE — 97110 THERAPEUTIC EXERCISES: CPT

## 2022-05-04 PROCEDURE — 97535 SELF CARE MNGMENT TRAINING: CPT

## 2022-05-04 PROCEDURE — 97530 THERAPEUTIC ACTIVITIES: CPT

## 2022-05-04 NOTE — PROGRESS NOTES
OT DAILY TREATMENT NOTE 10-18    Patient Name: Luis Borden  Date:2022  : 1970  [x]  Patient  Verified  Payor: [employer] / Plan: [address] / Product Type: Managed Care Medicare /    In time:215  Out time:300  Total Treatment Time (min): 45  Visit #: 1 of 8    Medicare/BCBS Only   Total Timed Codes (min):  45 1:1 Treatment Time:  45     Treatment Area: Weakness of left upper extremity [R29.898]    SUBJECTIVE  Pain Level (0-10 scale): 0/10  Any medication changes, allergies to medications, adverse drug reactions, diagnosis change, or new procedure performed?: [x] No    [] Yes (see summary sheet for update)  Subjective functional status/changes:   [] No changes reported  Per Mother/Caregiver patient is not known to wet himself    OBJECTIVE    8 min Therapeutic Exercise:  [] See flow sheet :   Rationale: increase ROM to improve the patients ability to increase movement in left UE.    AAROM shrugs and shoulder retraction 10x  High five with left hand 10x    10 min Therapeutic Activity:  []  See flow sheet :   Rationale: increase ROM  to improve the patients ability to increase movement in left UE. Placed cones using right hand to support cone and left hand to place and move. 12x     27 min Self Care/Home Management: house car and dressing   Rationale: improve coordination  to improve the patients ability to perform ADLs independently.     Dorothy and donning both hands  Folding shoes 2x  folded towel 3x with visional color cues   Unzipped and zipped zipper    With   [] TE   [] TA   [] neuro   [] other: Patient Education: [x] Review HEP    [] Progressed/Changed HEP based on:   [] positioning   [] body mechanics   [] transfers   [] heat/ice application   [] Splint wear/care   [] Sensory re-education   [] scar management      [] other:            Other Objective/Functional Measures:      Difficulties zipping a zipper  Two finger sublux at the beginning of session. At end of skilled occupational therapy session when patient was getting up from chair for physical therapy session, OTAS noticed the chair was wet and that patient has soiled himself at some point during occupational therapy session. Patient unaware that incident had occurred. Pain Level (0-10 scale) post treatment: 0/10    ASSESSMENT/Changes in Function:   Pt improved in folding abilities when given visual color cues. Spoke to physical therapist ([address])  and patient mother/caregiver regarding incident, patient left with caregiver after occupational therapy session to go home and change clothing. Patient will continue to benefit from skilled OT services to modify and progress therapeutic interventions, address ROM deficits, address strength deficits, analyze and cue movement patterns and instruct in home and community integration to attain remaining goals. []  See Plan of Care  []  See progress note/recertification  []  See Discharge Summary         Progress towards goals / Updated goals:  1. Mother will be independent in proper procedure for left UE ROM. Current Status (4/15/2022): initiated shrugs and retraction for sublux   Current Status (4/18/2022): educated mother on position for herself to help with shrugs and retraction        2. Patient will be able to reach to place items with left hand with 50% accuracy to shoulder level. 4/4 progressing with cone stakcing and saebo tree  Current Status (4/15/2022): gave high fives with left hand  Current Status (4/18/2022): Able to place rings on levels 1-2 saebo tree with minimal fatigue     3. Patient will be supervision level for left hand strengthening.  4/25: unable to get patient to use putty with left hand     4. Patient will be supervision level for AAROM ( bimanual ) tasks  4/21 ongoing cues to use left hand  Current Status (5/4/2022): needs assistant to complete shrug and retraction     Long Term Goals:  To be accomplished in 4 weeks:              1.  Patient will be able to don and doff all clothing with minimal assist for safety. 4/4 progressing with shoes and socks  4/25 able to don and doff shorts with supervision  4/25 able to don and doff shoes with supervision  Current Status (5/4/2022): don and doff shoe with minimal A    2. Patient will be able to fold laundry using left hand assist.  4/12: verbal cueing required  4/25 hand over hand or cueing required  Current Status (5/4/2022): improved with vision color cues to match colors       3. Patient will achieve at least 100 degrees of left shoulder flexion to hang clothes in closet. 4   Patient will improve ability to use left hand as assist as shown by increase in FOTO of at least 10 points    PLAN  []  Upgrade activities as tolerated     [x]  Continue plan of care  []  Update interventions per flow sheet       []  Discharge due to:_  []  Other:_      AMERICA Jauregui  5/4/2022  8:51 AM  I was present during the entire treatment, directing and participating in the treatment. Nato De La Cruz.  Tio Cotton        Future Appointments   Date Time Provider Janis Conteh   5/4/2022  2:15 PM Shawnaadam Zamora HAZEGIL [facility]   5/4/2022  3:00 PM César Ambrose, PTA MMCPTPB [facility]   5/6/2022 12:45 PM Carly Prow, PTA MMCPTPB [facility]   5/6/2022  1:30 PM Shawna [place] FGOEFRC [facility]   5/9/2022  1:30 PM Shawna [place] NLXWBSB [facility]   5/9/2022  2:15 PM Carly Prow, PTA MMCPTPB [facility]   5/12/2022  1:30 PM Shawna [place] MMEKYWL [facility]   5/12/2022  2:15 PM Carly Prow, PTA MMCPTPB [facility]   5/16/2022  1:30 PM Shawna Zamora [id number] [facility]   5/16/2022  2:15 PM Carly Prow, PTA MMCPTPB [facility]   5/18/2022  1:30 PM Sheryle Conn, OTR/L MMCPTPB [facility]   5/18/2022  2:15 PM Farrah Nolene Magi, PT MMCPTPB [facility]   5/23/2022  1:30 PM Farrah Nolene Magi, PT [id number] [facility]   5/23/2022  2:15 PM Shawna Zamora FLVZKFK [facility]   5/26/2022  1:30 PM Shawna Zamora [id number] [facility]   5/26/2022  2:15 PM Farrah Espinoza, PT UGWSYMQ [facility]   5/31/2022  1:30 PM Gama Blackwood TCDIPPZ [facility]   5/31/2022  2:15 PM Brenda Barrera, PTA MMCPTPB [facility]   6/2/2022  1:30 PM Brenda Barrera, PTA MMCPTPB [facility]   6/2/2022  2:15 PM Gama Blackwood JBRUSWR [facility]   8/18/2022  2:00 PM Jyoti Kumar, [address]

## 2022-05-06 ENCOUNTER — HOSPITAL ENCOUNTER (OUTPATIENT)
Dept: PHYSICAL THERAPY | Age: 52
Discharge: HOME | End: 2022-05-06
Payer: MEDICARE

## 2022-05-06 PROCEDURE — 97535 SELF CARE MNGMENT TRAINING: CPT

## 2022-05-06 PROCEDURE — 97110 THERAPEUTIC EXERCISES: CPT

## 2022-05-06 PROCEDURE — 97530 THERAPEUTIC ACTIVITIES: CPT

## 2022-05-06 PROCEDURE — 97112 NEUROMUSCULAR REEDUCATION: CPT

## 2022-05-06 NOTE — PROGRESS NOTES
OT DAILY TREATMENT NOTE 10-18    Patient Name: Gilberto Ríos  Date:2022  : 1970  [x]  Patient  Verified  Payor: [employer]ICARE / Plan: [address] / Product Type: Managed Care Medicare /    In time:130  Out time:215  Total Treatment Time (min): 45  Visit #: 2 of 8    Medicare/BCBS Only   Total Timed Codes (min):  45 1:1 Treatment Time:  45     Treatment Area: Weakness of left upper extremity [R29.898]    SUBJECTIVE  Pain Level (0-10 scale): 0/10  Any medication changes, allergies to medications, adverse drug reactions, diagnosis change, or new procedure performed?: [x] No    [] Yes (see summary sheet for update)  Subjective functional status/changes:   [] No changes reported  Mother stated pt is doing great today. OBJECTIVE    20 min Therapeutic Exercise:  [] See flow sheet :   Rationale: increase ROM to improve the patients ability to increase movement in left UE    AAROM shrugs and shoulder retraction 10x  High five with left hand 10x  PROM right shoulder and elbow flex and ext 10x    15 min Therapeutic Activity:  []  See flow sheet :   Rationale: increase ROM  to improve the patients ability to reach  Rolling towels 3x  Left arm reaching activity seated at tabletop with shoulder arc with minimal A 30x    10 min Self Care/Home Management: house care and dressing   Rationale: increase ROM and improve coordination  to improve the patients ability to perform ADLs independently    Folded towel 3x   Unzipped and zipped zipper 3x      With   [] TE   [] TA   [] neuro   [] other: Patient Education: [x] Review HEP    [] Progressed/Changed HEP based on:   [] positioning   [] body mechanics   [] transfers   [] heat/ice application   [] Splint wear/care   [] Sensory re-education   [] scar management      [] other:            Other Objective/Functional Measures:    Difficulties matching corner to complete a fold  Increase tightness in left shoulder.   Two fingers sublux at beginning of session by end of session 1.5 fingers    Pain Level (0-10 scale) post treatment: 0/10    ASSESSMENT/Changes in Function:   Pt had difficulties completing all tasks during session. Spoke to pt about starting a restroom schedule to prevent soiling himself. Patient will continue to benefit from skilled OT services to modify and progress therapeutic interventions, address ROM deficits, address strength deficits, analyze and cue movement patterns and instruct in home and community integration to attain remaining goals. [x]  See Plan of Care  []  See progress note/recertification  []  See Discharge Summary         Progress towards goals / Updated goals:  1. Mother will be independent in proper procedure for left UE ROM. Current Status (4/15/2022): initiated shrugs and retraction for sublux   Current Status (4/18/2022): educated mother on position for herself to help with shrugs and retraction   Current Status (5/6/2022): PROM shoulder and elbow flex and ext        2. Patient will be able to reach to place items with left hand with 50% accuracy to shoulder level. 4/4 progressing with cone stakcing and saebo tree  Current Status (4/15/2022): gave high fives with left hand  Current Status (4/18/2022): Able to place rings on levels 1-2 saebo tree with minimal fatigue  Current Status (5/6/2022): required minimal A to place the rings on level 1 shoulder arc. 3.  Patient will be supervision level for left hand strengthening.  4/25: unable to get patient to use putty with left hand     4. Patient will be supervision level for AAROM ( bimanual ) tasks  4/21 ongoing cues to use left hand  Current Status (5/6/2022): needs assistant to complete shrug and retraction       Long Term Goals: To be accomplished in 4 weeks:              1.  Patient will be able to don and doff all clothing with minimal assist for safety.   4/4 progressing with shoes and socks  4/25 able to don and doff shorts with supervision  4/25 able to don and doff shoes with supervision  Current Status (5/4/2022): don and doff shoe with minimal A     2. Patient will be able to fold laundry using left hand assist.  4/12: verbal cueing required  4/25 hand over hand or cueing required  Current Status (5/4/2022): improved with vision color cues to match colors  Current Status (5/6/2022): rolling towel to improve matching up lines. 3.  Patient will achieve at least 100 degrees of left shoulder flexion to hang clothes in closet. 4   Patient will improve ability to use left hand as assist as shown by increase in FOTO of at least 10 points       PLAN  []  Upgrade activities as tolerated     [x]  Continue plan of care  []  Update interventions per flow sheet       []  Discharge due to:_  []  Other:_      AMERICA Gaxiola  5/6/2022  8:12 AM  I was present during the entire treatment, directing and participating in the treatment. Lenn Large.  [facility]        Future Appointments   Date Time Provider Janis Conteh   5/6/2022 12:45 PM Seth Thompson, PTA MMCPTPB [facility]   5/6/2022  1:30 PM Alma Solis [id number] [facility]   5/9/2022  1:30 PM Alma Solis MMCPTPB [facility]   5/9/2022  2:15 PM Seth Thompson PTA MMCPTPB [facility]   5/12/2022  1:30 PM Alma Solis [id number] [facility]   5/12/2022  2:15 PM Seth Thompson PTA MMCPTPB [facility]   5/16/2022  1:30 PM Alma Solis OWTFRFU [facility]   5/16/2022  2:15 PM Seth Thompson PTA MMCPTPB [facility]   5/18/2022  1:30 PM Alma Solis MMCPTPB [facility]   5/18/2022  2:15 PM Farrah Mabry, PT MMCPTPB [facility]   5/23/2022  1:30 PM Farrah Mabry, [place] MMCPTPB [facility]   5/23/2022  2:15 PM Alma BRADLEYKTICI [facility]   5/26/2022  1:30 PM Alma Solis YNFYYSB [facility]   5/26/2022  2:15 PM Farrah Mabry, PT MMCPTPB [facility]   5/31/2022  1:30 PM Alma Solis [id number] [facility]   5/31/2022  2:15 PM Seth Thompson, PTA MMCPTPB [facility]   6/2/2022  1:30 PM Seth Thompson, PTA MMCPTPB [facility] 6/2/2022  2:15 PM Rocio Oliveira [id number] [facility]   8/18/2022  2:00 PM Sommer Aguirre PA-C [id number]

## 2022-05-06 NOTE — PROGRESS NOTES
PT DAILY TREATMENT NOTE     Patient Name: Margot Ao  Date:2022  : 1970  [x]  Patient  Verified  Payor: [facility] MEDICARE / Plan: [address] / Product Type: Managed Care Medicare /    In time:12:45  Out time: 1:29  Total Treatment Time (min): 44  Visit #: 1 of 12    Medicare/BCBS Only   Total Timed Codes (min): 44 1:1 Treatment Time: 44       Treatment Area: Lower extremity weakness [R29.898]  Abnormal gait [R26.9]    SUBJECTIVE  Pain Level (0-10 scale): \"no\"  Any medication changes, allergies to medications, adverse drug reactions, diagnosis change, or new procedure performed?: [x] No    [] Yes (see summary sheet for update)  Subjective functional status/changes:   [] No changes reported  Mother reports Yoni Mayer continues to reach for mom for assistance with getting out of car but is able to (I) get in/out of car except for buckling/unbuckling seat belt.     OBJECTIVE      30 min Neuromuscular Re-education:  [x]  See flow sheet : scanning for cones, hurdles, ball reaches, step taps   Rationale: increase ROM, increase strength, improve coordination, improve balance and increase proprioception  to improve the patients ability to perform functional tasks with increased ease    14 min Therapeutic Activity:  [x]  See flow sheet : stair negotiation, car transfer   Rationale: increase ROM, increase strength, improve coordination, improve balance and increase proprioception  to improve the patients ability to perform functional tasks with increased ease          With   [] TE   [] TA   [] neuro   [] other: Patient Education: [x] Review HEP    [] Progressed/Changed HEP based on:   [] positioning   [] body mechanics   [] transfers   [] heat/ice application    [] other:      Other Objective/Functional Measures:     Challenged with stair negotiation, pt used B UE and with reciprocal steps going up and down stairs with CGA with min-mod A for encouragement due to fear.   Pt was able to complete car transfer with ease with exception of scanning to left to find belt buckle. Pt continues to ambulate with slow shuffling gait and left hip external rotation. He is fearful with hurdles and catches heel after stepping over. Challenged with half foam step overs    Pain Level (0-10 scale) post treatment: verbalized \"no\"    ASSESSMENT/Changes in Function:   Pt is making slow but steady progress towards goals. Pt was able to negotiate 4 steps with B UE with reciprocal step pattern with CGA. He continues to display fear with hurdles and negotiating stairs, reaching out to hold on to therapist.  He would benefit from mat to low table transfers to progress to floor to standing to promote (I) in the home. He would also benefit from continued half foam step overs to improve ambulation on uneven terrain. Patient will continue to benefit from skilled PT services to modify and progress therapeutic interventions, address functional mobility deficits, address ROM deficits, address strength deficits, analyze and address soft tissue restrictions, analyze and cue movement patterns, analyze and modify body mechanics/ergonomics, assess and modify postural abnormalities, address imbalance/dizziness and instruct in home and community integration to attain remaining goals. Progress towards goals / Updated goals:          1. Patient will improve FOTO score by at least 5 points in order to demonstrate functional improvement. 2. Patient will report \"no difficulty\" with \"Walk[ing] around 1 level (floor) of your home\" with FOTO in order to demonstrate improved tolerance to daily tasks. 3. Patient will navigate at least 4 stairs with no more than right handrail and supervision in order to navigate home with increase ease.  Progressing negotiated 4 stairs with B UE and min-mod verbal encouragement and CGA (5/6/22)               4. Patient will be able to perform floor transfer with no more than mod A in order to perform ADLs with increased ease. PLAN  [x]  Upgrade activities as tolerated     [x]  Continue plan of care  []  Update interventions per flow sheet       []  Discharge due to:_  []  Other:_       JONAH Hanks 5/6/2022  8:55 AM    I was present during the entire treatment, directing and participating in the treatment.    JER Foster       Future Appointments   Date Time Provider Janis Conteh   5/6/2022 12:45 PM Luíszell Osler, PTA MMCPTPB [facility]   5/6/2022  1:30 PM Raymond Breath [id number] [facility]   5/9/2022  1:30 PM Raymond Breath MMCPTPB [facility]   5/9/2022  2:15 PM Farrah Staples, PT MMCPTPB [facility]   5/12/2022  1:30 PM Raymond Breath [id number] [facility]   5/12/2022  2:15 PM Rozell Osler, PTA MMCPTPB [facility]   5/16/2022  1:30 PM Raymond Breath IAWIOBB [facility]   5/16/2022  2:15 PM Rozell Osler, PTA MMCPTPB [facility]   5/18/2022  1:30 PM Raymond Breath [id number] [facility]   5/18/2022  2:15 PM Farrah Gleason Alas, PT MMCPTPB [facility]   5/23/2022  1:30 PM Farrahmoncho Gleason Alas, PT MMCPTPB [facility]   5/23/2022  2:15 PM Raymond Breath HJNAFCJ [facility]   5/26/2022  1:30 PM Raymond Breath CHZTBWQ [facility]   5/26/2022  2:15 PM Farrah Staples, PT MMCPTPB [facility]   5/31/2022  1:30 PM Raymond Breath RONWPWN [facility]   5/31/2022  2:15 PM Rozell Osler, PTA MMCPTPB [facility]   6/2/2022  1:30 PM Rozell Osler, PTA MMCPTPB [facility]   6/2/2022  2:15 PM Raymond Breath [id number] [facility]   8/18/2022  2:00 PM Oscar Perez, [address]

## 2022-05-09 ENCOUNTER — HOSPITAL ENCOUNTER (OUTPATIENT)
Dept: PHYSICAL THERAPY | Age: 52
Discharge: HOME | End: 2022-05-09
Payer: MEDICARE

## 2022-05-09 PROCEDURE — 97535 SELF CARE MNGMENT TRAINING: CPT

## 2022-05-09 PROCEDURE — 97110 THERAPEUTIC EXERCISES: CPT

## 2022-05-09 PROCEDURE — 97530 THERAPEUTIC ACTIVITIES: CPT

## 2022-05-09 PROCEDURE — 97112 NEUROMUSCULAR REEDUCATION: CPT

## 2022-05-09 NOTE — PROGRESS NOTES
PT DAILY TREATMENT NOTE     Patient Name: Lamine Bennett  Date:2022  : 1970  [x]  Patient  Verified  Payor: [facility] MEDICARE / Plan: "[address] / Product Type: Managed Care Medicare /    In time:2:16P  Out time:2:59P  Total Treatment Time (min): 43  Visit #: 2 of 12    Medicare/BCBS Only   Total Timed Codes (min):  43 1:1 Treatment Time:  43       Treatment Area: Lower extremity weakness [R29.898]  Abnormal gait [R26.9]    SUBJECTIVE  Pain Level (0-10 scale): \"no\"  Any medication changes, allergies to medications, adverse drug reactions, diagnosis change, or new procedure performed?: [x] No    [] Yes (see summary sheet for update)  Subjective functional status/changes:   [] No changes reported    Patient denies pain today.      OBJECTIVE        28 min Therapeutic Activity:  []  See flow sheet : stairs, cone finding   Rationale: increase ROM, increase strength, improve coordination, improve balance and increase proprioception  to improve the patients ability to navigate home with increased ease     15 min Neuromuscular Re-education:  []  See flow sheet : foam balance, obstacle course   Rationale: increase ROM, increase strength, improve coordination, improve balance and increase proprioception  to improve the patients ability to perform ADLs with increased ease            With   [] TE   [] TA   [] neuro   [] other: Patient Education: [x] Review HEP    [] Progressed/Changed HEP based on:   [] positioning   [] body mechanics   [] transfers   [] heat/ice application    [] other:      Other Objective/Functional Measures:     Sits at quarter turn to right intermittently  Obstacle course: low jennifer, stepping over 1/2 foam, ascending/descending foam<>6\" step  Handheld assist-able to perform with holding onto therapists hand with left hand  Max cues to step close to object before navigating    Cone search in clinic with cones of various colors placed at various heights and in areas requiring patient to navigate tighter spaces  Stairs: attempted to initiate with use of right handrail only with decreased stability improved with verbal and visual cues to use both handrails; step to pattern to ascend  Increased fearfulness with stairs with mod to max cues initially to turn to descend  Patient able to descend last 2 steps with reciprocal pattern with with increased genu varus and tibial ER  CGA with stairs  Balance on foam while passing ball-patient desired to use right hand only but able to perform passes using left UE while holding therapist's hand on right      Pain Level (0-10 scale) post treatment: \"no\"    ASSESSMENT/Changes in Function: Patient making slow, steady progress towards goals with skilled outpatient PT. He continues to demonstrate increased stability and fearfulness with navigating hurdles and stairs requiring at least handheld assist (CGA for stairs) however he is able to perform activities with improved clearance and form. He demonstrates difficulty with navigating tight spaces requiring verbal cues for positioning/sidestepping. Patient continues to ambulate with decreased gait speed but demonstrates improved foot clearance B. Patient will continue to benefit from skilled PT services to modify and progress therapeutic interventions, address functional mobility deficits, address ROM deficits, address strength deficits, analyze and address soft tissue restrictions, analyze and cue movement patterns, analyze and modify body mechanics/ergonomics, assess and modify postural abnormalities, address imbalance/dizziness and instruct in home and community integration to attain remaining goals. Progress towards goals / Updated goals:      1.  Patient will improve FOTO score by at least 5 points in order to demonstrate functional improvement.               2. Patient will report \"no difficulty\" with \"Walk[ing] around 1 level (floor) of your home\" with FOTO in order to demonstrate improved tolerance to daily tasks.               3. Patient will navigate at least 4 stairs with no more than right handrail and supervision in order to navigate home with increase ease. Progressing negotiated 4 stairs with B UE and min-mod verbal encouragement and CGA (5/6/22)               4. Patient will be able to perform floor transfer with no more than mod A in order to perform ADLs with increased ease.      PLAN  []  Upgrade activities as tolerated     []  Continue plan of care  []  Update interventions per flow sheet       []  Discharge due to:_  []  Other:_      Juana Ragland, PT 5/9/2022  9:11 AM    Future Appointments   Date Time Provider Janis Conteh   5/9/2022  1:30 PM Rios Whitlock [id number] [facility]   5/9/2022  2:15 PM Farrah Hurtado, PT MMCPTPB [facility]   5/12/2022  1:30 PM Rios Whitlock CZLFXGA [facility]   5/12/2022  2:15 PM Perla Hill, PTA MMCPTPB [facility]   5/16/2022  1:30 PM Rios Whitlock KIVWACU [facility]   5/16/2022  2:15 PM Perla Hill, PTA MMCPTPB [facility]   5/18/2022  1:30 PM Rios Whitlock IMNLMYA [facility]   5/18/2022  2:15 PM Farrah Hurtado, PT MMCPTPB [facility]   5/23/2022  1:30 PM Farrah Hurtado, PT DNGAOYC [facility]   5/23/2022  2:15 PM Rios Whitlock YMZEVDW [facility]   5/26/2022  1:30 PM Rios Whitlock YOJQOFG [facility]   5/26/2022  2:15 PM Farrah Hurtado, PT MMCPTPB [facility]   5/31/2022  1:30 PM Rios Whitlock KXXHPXJ [facility]   5/31/2022  2:15 PM Perla Hill, PTA MMCPTPB [facility]   6/2/2022  1:30 PM Perla Hill, PTA MMCPTPB [facility]   6/2/2022  2:15 PM Rios Whitlock MPUPAWE [facility]   8/18/2022  2:00 PM Marilu Perez, [address]

## 2022-05-09 NOTE — PROGRESS NOTES
OT DAILY TREATMENT NOTE 10-18    Patient Name: Emanuel Miller  Date:2022  : 1970  [x]  Patient  Verified  Payor: [place] [facility] MEDICARE / Plan: [address] / Product Type: Managed Care Medicare /    In time:135  Out time:215  Total Treatment Time (min): 40  Visit #: 3 of 8    Medicare/BCBS Only   Total Timed Codes (min):  40 1:1 Treatment Time:  40     Treatment Area: Weakness of left upper extremity [R29.898]    SUBJECTIVE  Pain Level (0-10 scale): 0/10  Any medication changes, allergies to medications, adverse drug reactions, diagnosis change, or new procedure performed?: [x] No    [] Yes (see summary sheet for update)  Subjective functional status/changes:   [] No changes reported  patient responded with \"yes\" when asked if he helps his mom with laundry     OBJECTIVE  10 min Therapeutic Exercise:  [] See flow sheet :   Rationale: increase ROM and increase strength to improve the patients ability to     Soft Theraputty with L Bar and large knob       10 min Therapeutic Activity:  []  See flow sheet :   Rationale: increase ROM and improve coordination  to improve the patients ability to reach     Reaching: Left UE- picked up stress ball from basket placed on left side with left hand, reached up through saebo ring to put stress balls into bucket 12x       Scratch art with Left hand      20 min Self Care/Home Management: Home/Self care tasks    Rationale: increase ROM, increase strength and improve coordination  to improve the patients ability to perform ADL/IADL's with increased ease and independence    Buttoning Table level: Max difficulty- demo and Max assist required   Clothing Management: placing shirt, small zip up vest on  and hanging up (item placed on left side)   Zipper: B Hands- cueing/assist required- prompting to incorporate left UE into tasks   Sock Matching/Folding- table level       With   [] TE   [] TA   [] neuro   [] other: Patient Education: [x] Review HEP    [] Progressed/Changed HEP based on:   [] positioning   [] body mechanics   [] transfers   [] heat/ice application   [] Splint wear/care   [] Sensory re-education   [] scar management      [] other:            Other Objective/Functional Measures:     Ongoing cueing to incorporate Left UE into tasks required     Pain Level (0-10 scale) post treatment: 0/10    ASSESSMENT/Changes in Function: able to flatten putty with use of tools     Patient will continue to benefit from skilled OT services to modify and progress therapeutic interventions, address ROM deficits, address strength deficits and instruct in home and community integration to attain remaining goals. []  See Plan of Care  []  See progress note/recertification  []  See Discharge Summary         Progress towards goals / Updated goals:  1.  Mother will be independent in proper procedure for left UE ROM. Current Status (4/15/2022): initiated shrugs and retraction for sublux   Current Status (4/18/2022): educated mother on position for herself to help with shrugs and retraction   Current Status (5/6/2022): PROM shoulder and elbow flex and ext        2.  Patient will be able to reach to place items with left hand with 50% accuracy to shoulder level. 4/4 progressing with cone stakcing and saebo tree  Current Status (4/15/2022): gave high fives with left hand  Current Status (4/18/2022): Able to place rings on levels 1-2 saebo tree with minimal fatigue  Current Status (5/6/2022): required minimal A to place the rings on level 1 shoulder arc.     3.  Patient will be supervision level for left hand strengthening.  4/25: unable to get patient to use putty with left hand     4.  Patient will be supervision level for AAROM ( bimanual ) tasks  4/21 ongoing cues to use left hand  Current Status (5/6/2022): needs assistant to complete shrug and retraction        Long Term Goals: To be accomplished in 4 weeks:              1.  Patient will be able to don and doff all clothing with minimal assist for safety. 4/4 progressing with shoes and socks  4/25 able to don and doff shorts with supervision  4/25 able to don and doff shoes with supervision  Current Status (5/4/2022): don and doff shoe with minimal A     2.  Patient will be able to fold laundry using left hand assist.  4/12: verbal cueing required  4/25 hand over hand or cueing required  Current Status (5/4/2022): improved with vision color cues to match colors  Current Status (5/6/2022): rolling towel to improve matching up lines.        3.  Patient will achieve at least 100 degrees of left shoulder flexion to hang clothes in closet. Current Status (5/9/2022):  Able to place stress balls through saebo ring at shoulder level    4   Patient will improve ability to use left hand as assist as shown by increase in FOTO of at least 10 points    PLAN  []  Upgrade activities as tolerated     [x]  Continue plan of care  []  Update interventions per flow sheet       []  Discharge due to:_  []  Other:_      BENJIE TonyA/L  5/9/2022  1:37 PM        Future Appointments   Date Time Provider Janis Conteh   5/9/2022  2:15 PM Farrah Sue, PT MMCPTPB [facility]   5/12/2022  1:30 PM Warren Smiley HHOBVRW [facility]   5/12/2022  2:15 PM March Massed, PTA MMCPTPB [facility]   5/16/2022  1:30 PM Warren Smiley MMCPTPB [facility]   5/16/2022  2:15 PM March Massed, PTA MMCPTPB [facility]   5/18/2022  1:30 PM Warren Smiley TIOLORT [facility]   5/18/2022  2:15 PM Farrah Sue, PT MMCPTPB [facility]   5/23/2022  1:30 PM Farrah Sue, PT MMCPTPB [facility]   5/23/2022  2:15 PM Warren Smiley YAJQOHH [facility]   5/26/2022  1:30 PM Warren Smiley AIWVXHX [facility]   5/26/2022  2:15 PM Farrah Sue, PT MMCPTPB [facility]   5/31/2022  1:30 PM Warren Smiley AKGEHUG [facility]   5/31/2022  2:15 PM March Massed, PTA MMCPTPB [facility]   6/2/2022  1:30 PM March Massed, PTA MMCPTPB [facility]   6/2/2022  2:15 PM Warren Smiley YTLLPQN [facility]   8/18/2022  2:00 PM Octavio Caballero PA-C [address]

## 2022-05-12 ENCOUNTER — HOSPITAL ENCOUNTER (OUTPATIENT)
Dept: PHYSICAL THERAPY | Age: 52
Discharge: HOME | End: 2022-05-12
Payer: MEDICARE

## 2022-05-12 PROCEDURE — 97112 NEUROMUSCULAR REEDUCATION: CPT

## 2022-05-12 PROCEDURE — 97530 THERAPEUTIC ACTIVITIES: CPT

## 2022-05-12 PROCEDURE — 97110 THERAPEUTIC EXERCISES: CPT

## 2022-05-12 NOTE — PROGRESS NOTES
OT DAILY TREATMENT NOTE 10-18    Patient Name: Jennifer Hodgkin  Date:2022  : 1970  [x]  Patient  Verified  Payor: [place] [facility] MEDICARE / Plan: [address] / Product Type: Managed Care Medicare /    In time:135  Out time:215  Total Treatment Time (min): 40  Visit #: 4 of 8    Medicare/BCBS Only   Total Timed Codes (min):  40 1:1 Treatment Time:  40     Treatment Area: Weakness of left upper extremity [R29.898]    SUBJECTIVE  Pain Level (0-10 scale): 0/10  Any medication changes, allergies to medications, adverse drug reactions, diagnosis change, or new procedure performed?: [x] No    [] Yes (see summary sheet for update)  Subjective functional status/changes:   [] No changes reported  Late arrival for OT session     OBJECTIVE    23 min Therapeutic Exercise:  [] See flow sheet :   Rationale: increase ROM and increase strength to improve the patients ability to     Soft Theraputty: Left UE with use of L Bar to flatten putty   Shoulder arc- Left UE- small arc 4x, large arc 2x  Hand popper- hand over hand assist - left hand    17 min Therapeutic Activity:  []  See flow sheet :   Rationale: increase ROM and improve coordination  to improve the patients ability to functionally use Left hand     Coins: from table with left hand into bank, pushed down with left IF/MF  Frogs: Pressed down with Left IF/MF  Finger basketball- Left IF/MF      With   [] TE   [] TA   [] neuro   [] other: Patient Education: [x] Review HEP    [] Progressed/Changed HEP based on:   [] positioning   [] body mechanics   [] transfers   [] heat/ice application   [] Splint wear/care   [] Sensory re-education   [] scar management      [] other:            Other Objective/Functional Measures:     Ongoing cueing to incorporate Left hand into tasks   Prompting for left hand use to  coins from table that improved as task progressed     Pain Level (0-10 scale) post treatment: 010    ASSESSMENT/Changes in Function: ongoing difficulty incorporating left UE    Patient will continue to benefit from skilled OT services to modify and progress therapeutic interventions, address ROM deficits, address strength deficits and instruct in home and community integration to attain remaining goals. []  See Plan of Care  []  See progress note/recertification  []  See Discharge Summary         Progress towards goals / Updated goals:  1.  Mother will be independent in proper procedure for left UE ROM. Current Status (4/15/2022): initiated shrugs and retraction for sublux   Current Status (4/18/2022): educated mother on position for herself to help with shrugs and retraction   Current Status (5/6/2022): PROM shoulder and elbow flex and ext        2.  Patient will be able to reach to place items with left hand with 50% accuracy to shoulder level. 4/4 progressing with cone stakcing and saebo tree  Current Status (4/15/2022): gave high fives with left hand  Current Status (4/18/2022): Able to place rings on levels 1-2 saebo tree with minimal fatigue  Current Status (5/6/2022): required minimal A to place the rings on level 1 shoulder arc.     3.  Patient will be supervision level for left hand strengthening.  4/25: unable to get patient to use putty with left hand  Current Status (5/12/2022): able to flatted soft theraputty with left hand with use of L Bar        4.  Patient will be supervision level for AAROM ( bimanual ) tasks  4/21 ongoing cues to use left hand  Current Status (5/6/2022): needs assistant to complete shrug and retraction        Long Term Goals: To be accomplished in 4 weeks:              1.  Patient will be able to don and doff all clothing with minimal assist for safety.   4/4 progressing with shoes and socks  4/25 able to don and doff shorts with supervision  4/25 able to don and doff shoes with supervision  Current Status (5/4/2022): don and doff shoe with minimal A     2.  Patient will be able to fold laundry using left hand assist.  4/12: verbal cueing required  4/25 hand over hand or cueing required  Current Status (5/4/2022): improved with vision color cues to match colors  Current Status (5/6/2022): rolling towel to improve matching up lines.     3.  Patient will achieve at least 100 degrees of left shoulder flexion to hang clothes in closet. Current Status (5/9/2022):  Able to place stress balls through saebo ring at shoulder level     4   Patient will improve ability to use left hand as assist as shown by increase in FOTO of at least 10 points    PLAN  []  Upgrade activities as tolerated     [x]  Continue plan of care  []  Update interventions per flow sheet       []  Discharge due to:_  []  Other:_      SANDY Wright  5/12/2022  10:17 AM        Future Appointments   Date Time Provider Janis Conteh   5/12/2022  1:30 PM Sarah Patten   5/12/2022  2:15 PM Evie Bermudez PTA MMCPTPB [facility]   5/16/2022  1:30 PM Marikatie Fagan IZNVTBJ [facility]   5/16/2022  2:15 PM Evie Bermudez PTA MMCPTPB [facility]   5/18/2022  1:30 PM Katlyn Fagan [id number] [facility]   5/18/2022  2:15 PM Farrah Forbes, PT MMCPTPB [facility]   5/23/2022  1:30 PM Farrah Forbes, PT MMCPTPB [facility]   5/23/2022  2:15 PM Marinus Rape GMOUEBA [facility]   5/26/2022  1:30 PM Marinus Rape EPZBWOK [facility]   5/26/2022  2:15 PM Farrah Forbes, PT MMCPTPB [facility]   5/31/2022  1:30 PM Marinus Rape FRTUEMF [facility]   5/31/2022  2:15 PM Evie Bermudez, PTA MMCPTPB [facility]   6/2/2022  1:30 PM Evie Bermudez PTA MMCPTPB [facility]   6/2/2022  2:15 PM Katlyn Fagan HNRXOQS [facility]   8/18/2022  2:00 PM Lamar Sims, [address]

## 2022-05-12 NOTE — PROGRESS NOTES
PT DAILY TREATMENT NOTE     Patient Name: Lolis Quinones  Date:2022  : 1970  [x]  Patient  Verified  Payor: [facility] MEDICARE / Plan: [address] / Product Type: Managed Care Medicare /    In time: 2:15  Out time: 2:53  Total Treatment Time (min): 38  Visit #: 3 of 12    Medicare/BCBS Only   Total Timed Codes (min):  38 1:1 Treatment Time: 38       Treatment Area: Lower extremity weakness [R29.898]  Abnormal gait [R26.9]    SUBJECTIVE  Pain Level (0-10 scale): \"no\"  Any medication changes, allergies to medications, adverse drug reactions, diagnosis change, or new procedure performed?: [x] No    [] Yes (see summary sheet for update)  Subjective functional status/changes:   [] No changes reported  Mom states overall balance and walking seems to be getting better. She notes difficulty with putting on the seatbelt and still struggles on the stairs. She sees sometimes he seems to be challenged with walking between different surfaces.      OBJECTIVE    15 min Therapeutic Activity:  [x]  See flow sheet : car seat belt simulation and performing in car; step up and overs; sit to stands   Rationale: increase ROM, increase strength, improve coordination, improve balance and increase proprioception  to improve the patients ability to navigate home with increased ease     23 min Neuromuscular Re-education:  [x]  See flow sheet : soccer dribbles, cone searching; ball hits; ball in hoop   Rationale: increase ROM, increase strength, improve coordination, improve balance and increase proprioception  to improve the patients ability to perform ADLs with increased ease            With   [] TE   [] TA   [] neuro   [] other: Patient Education: [x] Review HEP    [] Progressed/Changed HEP based on:   [] positioning   [] body mechanics   [] transfers   [] heat/ice application    [] other:      Other Objective/Functional Measures:   Improved left tracking for cone searching and finding  Able to complete soccer dribbles and finds around clinic with hand held assist  Simulated car transfers using bright green band to find buckle and put a green band on the buckle in the car; pt able to complete with verbal cues after 3 trials  Increased right LE use with sit to stands  Cues for fully turning before completing step up and overs in the //  Improved use of left UE for ball hits with the 1# bar    Pain Level (0-10 scale) post treatment: \"no\"    ASSESSMENT/Changes in Function: Pt continues to make slow, steady progress with improving left side tracking for tasks like cone finding and buckling his seatbelt. He continues to have increased fear with stairs but note difficulty potentially with vision or depth perception as pt struggles with ambulation from various surfaces and with various heights of objects to step on or over. Patient will continue to benefit from skilled PT services to modify and progress therapeutic interventions, address functional mobility deficits, address ROM deficits, address strength deficits, analyze and address soft tissue restrictions, analyze and cue movement patterns, analyze and modify body mechanics/ergonomics, assess and modify postural abnormalities, address imbalance/dizziness and instruct in home and community integration to attain remaining goals. Progress towards goals / Updated goals:      1. Patient will improve FOTO score by at least 5 points in order to demonstrate functional improvement.               2. Patient will report \"no difficulty\" with \"Walk[ing] around 1 level (floor) of your home\" with FOTO in order to demonstrate improved tolerance to daily tasks.               3. Patient will navigate at least 4 stairs with no more than right handrail and supervision in order to navigate home with increase ease.  Progressing negotiated 4 stairs with B UE and min-mod verbal encouragement and CGA (5/6/22)               4. Patient will be able to perform floor transfer with no more than mod A in order to perform ADLs with increased ease.      PLAN  [x]  Upgrade activities as tolerated     [x]  Continue plan of care  []  Update interventions per flow sheet       []  Discharge due to:_  []  Other:_      Holden Trevino PTA 5/12/2022  9:11 AM    Future Appointments   Date Time Provider Janis Conteh   5/12/2022  1:30 PM Rosmery Dopp [id number] [address]   5/12/2022  2:15 PM Gillermina Jackson, PTA MMCPTPB [address]   5/16/2022  1:30 PM Rosmery Dopp LGSUCEU [address]   5/16/2022  2:15 PM Gillermina Jackson, PTA MMCPTPB [address]   5/18/2022  1:30 PM Rosmery Dopp [id number] [address]   5/18/2022  2:15 PM Farrah [employer], PT MMCPTPB [address]   5/23/2022  1:30 PM Farrah [employer], PT [id number] [address]   5/23/2022  2:15 PM Rosmery Dopp BZYUGID [address]   5/26/2022  1:30 PM Rosmery Dopp ESATSSA [address]   5/26/2022  2:15 PM Farrah [employer], PT MMCPTPB [address]   5/31/2022  1:30 PM Rosmery Dopp [id number] [address]   5/31/2022  2:15 PM Gillermina Jackson, PTA MMCPTPB [address]   6/2/2022  1:30 PM Gillermina Jackson, PTA MMCPTPB [address]   6/2/2022  2:15 PM Rosmery Dopp [id number] [address]   8/18/2022  2:00 PM Suzanna Barr, [address]

## 2022-05-16 ENCOUNTER — HOSPITAL ENCOUNTER (OUTPATIENT)
Dept: PHYSICAL THERAPY | Age: 52
Discharge: HOME | End: 2022-05-16
Payer: MEDICARE

## 2022-05-16 PROCEDURE — 97112 NEUROMUSCULAR REEDUCATION: CPT

## 2022-05-16 PROCEDURE — 97535 SELF CARE MNGMENT TRAINING: CPT

## 2022-05-16 PROCEDURE — 97530 THERAPEUTIC ACTIVITIES: CPT

## 2022-05-16 PROCEDURE — 97110 THERAPEUTIC EXERCISES: CPT

## 2022-05-16 NOTE — PROGRESS NOTES
OT DAILY TREATMENT NOTE 10-18    Patient Name: Yuniel Velasquez  Date:2022  : 1970  [x]  Patient  Verified  Payor: [place] [facility] / Plan: [address] / Product Type: Managed Care Medicare /    In time:130  Out time:215  Total Treatment Time (min): 45  Visit #: 5 of 8    Medicare/BCBS Only   Total Timed Codes (min):  45 1:1 Treatment Time:  45     Treatment Area: Weakness of left upper extremity [R29.898]    SUBJECTIVE  Pain Level (0-10 scale): 0/10  Any medication changes, allergies to medications, adverse drug reactions, diagnosis change, or new procedure performed?: [x] No    [] Yes (see summary sheet for update)  Subjective functional status/changes:   [] No changes reported  Patient responded \"No\" when asked if he was in any pain     OBJECTIVE     15 min Therapeutic Exercise:  [] See flow sheet :   Rationale: increase ROM and increase strength to improve the patients ability to     Soft Theraputty: Using Left- Flattened with L Bar   2 Band hand helper: left hand (no blocks) 15x     10 min Therapeutic Activity:  []  See flow sheet :   Rationale: increase ROM and improve coordination  to improve the patients ability to move Left UE smoothly    Stress Ball Toss: Seated in chair, used Left UE to toss varied stress balls into bucket     20 min Self Care/Home Management: Cleaning    Rationale: increase ROM, increase strength and improve coordination  to improve the patients ability to perform self and home care tasks with increased ease and independence     Dust Dickey: using Left hand to manipulate brush to sweep items into pan   : Seated in Chair, Used Left UE to move  into various directions       With   [] TE   [] TA   [] neuro   [] other: Patient Education: [x] Review HEP    [] Progressed/Changed HEP based on:   [] positioning   [] body mechanics   [] transfers   [] heat/ice application   [] Splint wear/care   [] Sensory re-education   [] scar management      [] other:            Other Objective/Functional Measures:     Increased time required for active participation   Ongoing cueing/prompting to not use Right UE     Pain Level (0-10 scale) post treatment: 0/10    ASSESSMENT/Changes in Function: improved functional use of Left UE when tasked with \"daily activities\"    Patient will continue to benefit from skilled OT services to modify and progress therapeutic interventions, address ROM deficits, address strength deficits and instruct in home and community integration to attain remaining goals. []  See Plan of Care  []  See progress note/recertification  []  See Discharge Summary         Progress towards goals / Updated goals:  1.  Mother will be independent in proper procedure for left UE ROM. Current Status (4/15/2022): initiated shrugs and retraction for sublux   Current Status (4/18/2022): educated mother on position for herself to help with shrugs and retraction   Current Status (5/6/2022): PROM shoulder and elbow flex and ext        2.  Patient will be able to reach to place items with left hand with 50% accuracy to shoulder level.   4/4 progressing with cone stakcing and saebo tree  Current Status (4/15/2022): gave high fives with left hand  Current Status (4/18/2022): Able to place rings on levels 1-2 saebo tree with minimal fatigue  Current Status (5/6/2022): required minimal A to place the rings on level 1 shoulder arc.     3.  Patient will be supervision level for left hand strengthening.  4/25: unable to get patient to use putty with left hand  Current Status (5/12/2022): able to flatted soft theraputty with left hand with use of L Bar         4.  Patient will be supervision level for AAROM ( bimanual ) tasks  4/21 ongoing cues to use left hand  Current Status (5/6/2022): needs assistant to complete shrug and retraction        Long Term Goals: To be accomplished in 4 weeks:              1.  Patient will be able to don and doff all clothing with minimal assist for safety. 4/4 progressing with shoes and socks  4/25 able to don and doff shorts with supervision  4/25 able to don and doff shoes with supervision  Current Status (5/4/2022): don and doff shoe with minimal A     2.  Patient will be able to fold laundry using left hand assist.  4/12: verbal cueing required  4/25 hand over hand or cueing required  Current Status (5/4/2022): improved with vision color cues to match colors  Current Status (5/6/2022): rolling towel to improve matching up lines.     3.  Patient will achieve at least 100 degrees of left shoulder flexion to hang clothes in closet.   Current Status (5/9/2022): Able to place stress balls through saebo ring at shoulder level     4   Patient will improve ability to use left hand as assist as shown by increase in FOTO of at least 10 points       PLAN  []  Upgrade activities as tolerated     [x]  Continue plan of care  []  Update interventions per flow sheet       []  Discharge due to:_  []  Other:_      SANDY Yuan  5/16/2022  10:58 AM        Future Appointments   Date Time Provider Janis Conteh   5/16/2022  1:30 PM Gama COELHO [facility]   5/16/2022  2:15 PM Brenda Barrera PTA MMCPTPB [facility]   5/18/2022  1:30 PM Gama ARCHIBALDIJLBE [facility]   5/18/2022  2:15 PM Farrah Espinoza, PT MMCPTPB [facility]   5/23/2022  1:30 PM Farrah Espinoza, PT CVBPBWM [facility]   5/23/2022  2:15 PM Gama BLAKEU [facility]   5/26/2022  1:30 PM Gama Blackwood NSJBZAR [facility]   5/26/2022  2:15 PM Farrah Espinoza, PT MMCPTPB [facility]   5/31/2022  1:30 PM Gama Blackwood CVZMKPE [facility]   5/31/2022  2:15 PM Brenda Barrera, PTA MMCPTPB [facility]   6/2/2022  1:30 PM Brenda Barrera, PTA MMCPTPB [facility]   6/2/2022  2:15 PM Gama Blackwood [id number] [facility]   8/18/2022  2:00 PM Jyoti Kumar, [address]

## 2022-05-16 NOTE — PROGRESS NOTES
PT DAILY TREATMENT NOTE     Patient Name: Miquel Rodriguez  Date:2022  : 1970  [x]  Patient  Verified  Payor: [facility] MEDICARE / Plan: [address] / Product Type: Managed Care Medicare /    In time: 2:15  Out time: 2:55  Total Treatment Time (min): 40  Visit #: 4 of 12    Medicare/BCBS Only   Total Timed Codes (min): 40 1:1 Treatment Time: 40       Treatment Area: Lower extremity weakness [R29.898]  Abnormal gait [R26.9]    SUBJECTIVE  Pain Level (0-10 scale): \"no\"   Any medication changes, allergies to medications, adverse drug reactions, diagnosis change, or new procedure performed?: [x] No    [] Yes (see summary sheet for update)  Subjective functional status/changes:   [] No changes reported  Pt states no pain. His mom notes pt tried to fold a pillowcase over the weekend and has been doing better with going to the bathroom on his own. She didn't go anywhere over the weekend to have him work on doing his own seatbelt. She states he is walking around the house on his own and has noticed improvement in his steps. He is still fearful on stairs.        OBJECTIVE    15 min Therapeutic Activity:  [x]  See flow sheet : stairs, sit to stands, seat belt fastening   Rationale: increase ROM, increase strength, improve coordination, improve balance and increase proprioception  to improve the patients ability to navigate home with increased ease     25 min Neuromuscular Re-education:  [x]  See flow sheet : see flow sheet   Rationale: increase ROM, increase strength, improve coordination, improve balance and increase proprioception  to improve the patients ability to perform ADLs with increased ease            With   [] TE   [] TA   [] neuro   [] other: Patient Education: [x] Review HEP    [] Progressed/Changed HEP based on:   [] positioning   [] body mechanics   [] transfers   [] heat/ice application    [] other:      Other Objective/Functional Measures: Improved ease of tracking to the left to find cones  Challenged with obstacle negotiation for various heights and following commands  Negotiated stairs with B Handrails but decided to come down backwards  Improved response time with beach ball hits and ball dribbles  Increased use of left arm noted during session    Pain Level (0-10 scale) post treatment: \"no\"    ASSESSMENT/Changes in Function: Pt progressing with improving use and awareness of the left side. He is ambulating with larger step length and has improved balance. He continues to be fearful with stairs but suspect more of a visual component versus strength or balance to be the cause. Will continue to work on increased use of left LE and progress functional deficits to avoid confusion and progress further independence at home. Patient will continue to benefit from skilled PT services to modify and progress therapeutic interventions, address functional mobility deficits, address ROM deficits, address strength deficits, analyze and address soft tissue restrictions, analyze and cue movement patterns, analyze and modify body mechanics/ergonomics, assess and modify postural abnormalities, address imbalance/dizziness and instruct in home and community integration to attain remaining goals. Progress towards goals / Updated goals:      1. Patient will improve FOTO score by at least 5 points in order to demonstrate functional improvement.               2. Patient will report \"no difficulty\" with \"Walk[ing] around 1 level (floor) of your home\" with FOTO in order to demonstrate improved tolerance to daily tasks.               3. Patient will navigate at least 4 stairs with no more than right handrail and supervision in order to navigate home with increase ease.  Progressing negotiated 4 stairs with B UE and min-mod verbal encouragement and CGA (5/6/22)               4. Patient will be able to perform floor transfer with no more than mod A in order to perform ADLs with increased ease.      PLAN  [x]  Upgrade activities as tolerated     [x]  Continue plan of care  []  Update interventions per flow sheet       []  Discharge due to:_  []  Other:_      Price Argueta, EMILIO 5/16/2022  9:11 AM    Future Appointments   Date Time Provider Janis Conteh   5/16/2022  1:30 PM Rios Chinyere AEQLWRT [facility]   5/16/2022  2:15 PM Perla Hill, PTA MMCPTPB [facility]   5/18/2022  1:30 PM Sonal Mcdonald, OTR/L MMCPTPB [facility]   5/18/2022  2:15 PM Farrah Hurtado, PT MMCPTPB [facility]   5/23/2022  1:30 PM Farrah Hurtado, PT [id number] [facility]   5/23/2022  2:15 PM Rios [place] AGVGSNO [facility]   5/26/2022  1:30 PM Rios Chinyere DIVWCJK [facility]   5/26/2022  2:15 PM Farrah Hurtado, PT PSTDXIJ [facility]   5/31/2022  1:30 PM [place] [id number] [facility]   5/31/2022  2:15 PM Perla Hill, PTA MMCPTPB [facility]   6/2/2022  1:30 PM Perla Hill, PTA MMCPTPB [facility]   6/2/2022  2:15 PM Rios [place] [id number] [facility]   8/18/2022  2:00 PM Marilu Perez, [address]

## 2022-05-18 ENCOUNTER — HOSPITAL ENCOUNTER (OUTPATIENT)
Dept: PHYSICAL THERAPY | Age: 52
Discharge: HOME | End: 2022-05-18
Payer: MEDICARE

## 2022-05-18 PROCEDURE — 97530 THERAPEUTIC ACTIVITIES: CPT

## 2022-05-18 PROCEDURE — 97535 SELF CARE MNGMENT TRAINING: CPT

## 2022-05-18 PROCEDURE — 97110 THERAPEUTIC EXERCISES: CPT

## 2022-05-18 PROCEDURE — 97112 NEUROMUSCULAR REEDUCATION: CPT

## 2022-05-18 NOTE — PROGRESS NOTES
PT DAILY TREATMENT NOTE     Patient Name: Tonja Joseph  Date:2022  : 1970  [x]  Patient  Verified  Payor: [facility] MEDICARE / Plan: [address] / Product Type: Managed Care Medicare /    In time:2:17P  Out time:2:56P  Total Treatment Time (min): 39  Visit #: 5 of 12    Medicare/BCBS Only   Total Timed Codes (min):  39 1:1 Treatment Time: 39       Treatment Area: Lower extremity weakness [R29.898]  Abnormal gait [R26.9]    SUBJECTIVE  Pain Level (0-10 scale): \"no\"  Any medication changes, allergies to medications, adverse drug reactions, diagnosis change, or new procedure performed?: [x] No    [] Yes (see summary sheet for update)  Subjective functional status/changes:   [] No changes reported    Patient denies pain today.     OBJECTIVE        11 min Therapeutic Activity:  [x]  See flow sheet : placing items into basket from left side to right side (at various heights/distances from patient)   Rationale: increase ROM, increase strength, improve coordination and increase proprioception  to improve the patients ability to perform ADLs with increased ease     28 min Neuromuscular Re-education:  [x]  See flow sheet : ambulating in gym with clothes basket collecting items   Rationale: increase ROM, increase strength, improve coordination, improve balance and increase proprioception  to improve the patients ability to perform ADLs with increased ease            With   [] TE   [] TA   [] neuro   [] other: Patient Education: [x] Review HEP    [] Progressed/Changed HEP based on:   [] positioning   [] body mechanics   [] transfers   [] heat/ice application    [] other:      Other Objective/Functional Measures:     Patient initially attempted to carry laundry basket in right UE only until realizing weight/awkwardness  Patient able to locate 3 clothing items placed around gym without max cues for direction/placement  Patient required rest break during activity and demonstrate increased fatigue with difficulty carrying basket once it became more full with items  One instance of patient being unable to avoid machine when coming around corner on right-no injury  Patient more hesitant of reaching around machines and reaching down to floor to  item under chair  Patient able to hold bag with left hand to place clothing items back in bag using right hand  Patient sat to reach to left side to grab items to place them back into basket on right  Patient attempted to reach across body with right UE to grab items but able to use left UE to hold items and place them in basket while therapist held right hand; upon therapist not holding hand patient returned to using right UE only unless object was larger  Ambulating with decreased step length and foot clearance along with wide RAMO improved with verbal and visual cues     Pain Level (0-10 scale) post treatment: \"yes\"    ASSESSMENT/Changes in Function: Patient demonstrates decreased left sided awareness this visit requiring mod to max verbal cues to turn to ambulate or locate objects in this direction. Patient will attempt to use right UE for all activities and will only attempt to use with max encouragement/cues or with activities requiring lifting heavier/larger objectd. He continues to remain fearful/hesitant with navigating around objects and reaching outside of RAMO in standing. He demonstrates improved gait mechanics with ability to ambulate with larger steps but continued wide RAMO.     Patient will continue to benefit from skilled PT services to modify and progress therapeutic interventions, address functional mobility deficits, address ROM deficits, address strength deficits, analyze and address soft tissue restrictions, analyze and cue movement patterns, analyze and modify body mechanics/ergonomics, assess and modify postural abnormalities, address imbalance/dizziness and instruct in home and community integration to attain remaining goals. Progress towards goals / Updated goals:     1.  Patient will improve FOTO score by at least 5 points in order to demonstrate functional improvement.               2. Patient will report \"no difficulty\" with \"Walk[ing] around 1 level (floor) of your home\" with FOTO in order to demonstrate improved tolerance to daily tasks.               3. Patient will navigate at least 4 stairs with no more than right handrail and supervision in order to navigate home with increase ease. Progressing negotiated 4 stairs with B UE and min-mod verbal encouragement and CGA (5/6/22)               4. Patient will be able to perform floor transfer with no more than mod A in order to perform ADLs with increased ease.     PLAN  [x]  Upgrade activities as tolerated     [x]  Continue plan of care  []  Update interventions per flow sheet       []  Discharge due to:_  []  Other:_      Jacey Horvath, PT 5/18/2022  8:14 AM    Future Appointments   Date Time Provider Janis Conteh   5/18/2022  1:30 PM Liberty Antony OTR/JOHNY MMCPTPB [facility]   5/18/2022  2:15 PM Farrah Hernandez, PT MMCPTPB [facility]   5/23/2022  1:30 PM Farrah Hernandez, PT MMCPTPB [facility]   5/23/2022  2:15 PM Ada Heater MMCPTPB [facility]   5/26/2022  1:30 PM Ada Heater RXRIPIN [facility]   5/26/2022  2:15 PM Farrah Hernandez, PT MMCPTPB [facility]   5/31/2022  1:30 PM Ada Heater XOXGSLZ [facility]   5/31/2022  2:15 PM Arline Londono, PTA MMCPTPB [facility]   6/2/2022  1:30 PM Arline Londono, PTA MMCPTPB [facility]   6/2/2022  2:15 PM Lorain Heater UHASIHT [facility]   8/18/2022  2:00 PM Eliezer Velázquez, [address]

## 2022-05-18 NOTE — PROGRESS NOTES
OT DAILY TREATMENT NOTE     Patient Name: Dali Valdez  Date:2022  : 1970  [x]  Patient  Verified  Payor: [employer] MEDICARE / Plan: [address] / Product Type: Managed Care Medicare /    In time:135  Out time:215  Total Treatment Time (min): 40  Visit #: 6 of 8    Medicare/BCBS Only   Total Timed Codes (min):  40 1:1 Treatment Time:  40     Treatment Area: Weakness of left upper extremity [R29.898]    SUBJECTIVE  Pain Level (0-10 scale): 0/10  Any medication changes, allergies to medications, adverse drug reactions, diagnosis change, or new procedure performed?: [x] No    [] Yes (see summary sheet for update)  Subjective functional status/changes:   [] No changes reported  Thank you    OBJECTIVE      15 min Therapeutic Exercise:  [] See flow sheet :   Rationale: increase strength to improve the patients ability to grasp  Graded clothespins 1,2,4,6# to dowle tree on left    15 min Therapeutic Activity:  []  See flow sheet :   Rationale: improve coordination  to improve the patients ability to use both hands  !  Inch beads using both hands to string x 5       10 min Self Care/Home Management: folding matching socks   Rationale: improve coordination  to improve the patients ability to both hands  Folding and rolling pair of socks with verbal and manual cues    With   [] TE   [] TA   [] neuro   [] other: Patient Education: [x] Review HEP    [] Progressed/Changed HEP based on: cues ot use left hand  [] positioning   [] body mechanics   [] transfers   [] heat/ice application   [] Splint wear/care   [] Sensory re-education   [] scar management      [] other:            Other Objective/Functional Measures:   Incorporating left hand without cueing at  times     Pain Level (0-10 scale) post treatment: 0/10    ASSESSMENT/Changes in Function: ongoing cueing for use of left at times    Patient will continue to benefit from skilled OT services to modify and progress therapeutic interventions, address ROM deficits, address strength deficits, analyze and address soft tissue restrictions, analyze and cue movement patterns and instruct in home and community integration to attain remaining goals. []  See Plan of Care  []  See progress note/recertification  []  See Discharge Summary         Progress towards goals / Updated goals:  1.  Mother will be independent in proper procedure for left UE ROM. Current Status (4/15/2022): initiated shrugs and retraction for sublux   Current Status (4/18/2022): educated mother on position for herself to help with shrugs and retraction   Current Status (5/6/2022): PROM shoulder and elbow flex and ext        2.  Patient will be able to reach to place items with left hand with 50% accuracy to shoulder level. 4/4 progressing with cone stakcing and saebo tree  Current Status (4/15/2022): gave high fives with left hand  Current Status (4/18/2022): Able to place rings on levels 1-2 saebo tree with minimal fatigue  Current Status (5/6/2022): required minimal A to place the rings on level 1 shoulder arc.  5/18 able to place clothespins on dowel tree to left     3.  Patient will be supervision level for left hand strengthening.  4/25: unable to get patient to use putty with left hand  Current Status (5/12/2022): able to flatted soft theraputty with left hand with use of L Bar      4.  Patient will be supervision level for AAROM ( bimanual ) tasks  4/21 ongoing cues to use left hand  Current Status (5/6/2022): needs assistant to complete shrug and retraction  5/18: used left to help with bead stringing without cueing        Long Term Goals: To be accomplished in 4 weeks:              1.  Patient will be able to don and doff all clothing with minimal assist for safety.   4/4 progressing with shoes and socks  4/25 able to don and doff shorts with supervision  4/25 able to don and doff shoes with supervision  Current Status (5/4/2022): don and doff shoe with minimal A     2.  Patient will be able to fold laundry using left hand assist.  4/12: verbal cueing required  4/25 hand over hand or cueing required  Current Status (5/4/2022): improved with vision color cues to match colors  Current Status (5/6/2022): rolling towel to improve matching up lines. 5/18: mom reported patient folding clothes at home ( to PT)     3.  Patient will achieve at least 100 degrees of left shoulder flexion to hang clothes in closet.   Current Status (5/9/2022): Able to place stress balls through saebo ring at shoulder     4   Patient will improve ability to use left hand as assist as shown by increase in FOTO of at least 10 points     PLAN  []  Upgrade activities as tolerated     []  Continue plan of care  []  Update interventions per flow sheet       []  Discharge due to:_  []  Other:_      Lucinda Lagunas OTR/L 5/18/2022  8:53 AM    Future Appointments   Date Time Provider Janis Conteh   5/18/2022  1:30 PM KASANDRA Wood/JOHNY MMCPTPB [facility]   5/18/2022  2:15 PM Farrah Noe Coppersmith, PT MMCPTPB [facility]   5/23/2022  1:30 PM Farrah Noe Coppersmith, PT MMCPTPB [facility]   5/23/2022  2:15 PM Alma Solis RDVSEYA [facility]   5/26/2022  1:30 PM Alma Solis QOLMBYM [facility]   5/26/2022  2:15 PM Farrah Noe Coppersmith, PT MMCPTPB [facility]   5/31/2022  1:30 PM Alma Solis LCOHASG [facility]   5/31/2022  2:15 PM Seth Carmonaot, PTA MMCPTPB [facility]   6/2/2022  1:30 PM Aishwaryaa Hoot, PTA MMCPTPB [facility]   6/2/2022  2:15 PM Alma ALFREDO [facility]   8/18/2022  2:00 PM Vera Dixon, [address]

## 2022-05-23 ENCOUNTER — HOSPITAL ENCOUNTER (OUTPATIENT)
Dept: PHYSICAL THERAPY | Age: 52
Discharge: HOME | End: 2022-05-23
Payer: MEDICARE

## 2022-05-23 NOTE — PROGRESS NOTES
Initiated therapy session with patient denying pain. Upon ambulating to back clinic, reported smell of gas prompted clinicians, staff, and patients to leave clinic. Patient's mother requested to leave after ~10 min of waiting outside. She reports improved gait mechanics and that patient is not getting on the floor at home.

## 2022-05-26 ENCOUNTER — HOSPITAL ENCOUNTER (OUTPATIENT)
Dept: PHYSICAL THERAPY | Age: 52
Discharge: HOME | End: 2022-05-26
Payer: MEDICARE

## 2022-05-26 PROCEDURE — 97530 THERAPEUTIC ACTIVITIES: CPT

## 2022-05-26 PROCEDURE — 97110 THERAPEUTIC EXERCISES: CPT

## 2022-05-26 PROCEDURE — 97535 SELF CARE MNGMENT TRAINING: CPT

## 2022-05-26 NOTE — PROGRESS NOTES
OT DAILY TREATMENT NOTE 10-18    Patient Name: Arlina Hashimoto  Date:2022  : 1970  [x]  Patient  Verified  Payor: [employer] / Plan: [address] / Product Type: Managed Care Medicare /    In time:130  Out time:215  Total Treatment Time (min): 45  Visit #: 7 of 8    Medicare/BCBS Only   Total Timed Codes (min):  45 1:1 Treatment Time:  45     Treatment Area: Weakness of left upper extremity [R29.898]    SUBJECTIVE  Pain Level (0-10 scale): \"no\"  Any medication changes, allergies to medications, adverse drug reactions, diagnosis change, or new procedure performed?: [x] No    [] Yes (see summary sheet for update)  Subjective functional status/changes:   [] No changes reported  Sister reports he was taking the laundry out of the dryer     OBJECTIVE    10 min Therapeutic Exercise:  [] See flow sheet :   Rationale: increase ROM and increase strength to improve the patients ability to reach     Recheck for PN  Forward Flexion   2# graded clothes pins- 3pt     25 min Therapeutic Activity:  []  See flow sheet :   Rationale: increase ROM and improve coordination  to improve the patients ability to manipulate small items, reach     Saebo Tree- lv 2 using Left hand   velcro poker chip place/remove- board on incline- Left UE  Frogs using Left hand     10 min Self Care/Home Management:    Rationale: increase ROM, increase strength and improve coordination  to improve the patients ability to perform home care tasks     Towel Folding   Review of goals    With   [] TE   [] TA   [] neuro   [] other: Patient Education: [x] Review HEP    [] Progressed/Changed HEP based on:   [] positioning   [] body mechanics   [] transfers   [] heat/ice application   [] Splint wear/care   [] Sensory re-education   [] scar management      [] other:            Other Objective/Functional Measures:     Left Shoulder Flexion: 78     Pain Level (0-10 scale) post treatment: 0/10    ASSESSMENT/Changes in Function: Functional use of Left UE improving, decreased need for cueing for left hand incorporation needed    Patient will continue to benefit from skilled OT services to modify and progress therapeutic interventions, address ROM deficits, address strength deficits and instruct in home and community integration to attain remaining goals. []  See Plan of Care  []  See progress note/recertification  []  See Discharge Summary         Progress towards goals / Updated goals:  1.  Mother will be independent in proper procedure for left UE ROM. Status at PN (5/26/2022):  Goal Met per mother and sister report    Donato Blum will be able to reach to place items with left hand with 50% accuracy to shoulder level. Status Last PN: progressing with cone stakcing and saebo tree  Status at PN (5/26/2022):  Goal Met with velcro poker chips and left hand    3.  Patient will be supervision level for left hand strengthening. Status at Last PN: unable to get patient to use putty with left hand  Status at PN (5/26/2022): Max A     4.  Patient will be supervision level for AAROM ( bimanual ) tasks  Status at Last PN: ongoing cues to use left hand  Status at PN (5/26/2022):  Cueing required      Long Term Goals: To be accomplished in 4 weeks:              1.  Patient will be able to don and doff all clothing with minimal assist for safety. Status at Last PN: able to don and doff shorts with supervision  Status at PN (5/26/2022): Mod A per sister report       2. Nelia Chopra will be able to fold laundry using left hand assist.  Status at Last PN: hand over hand or cueing required  Current Status (5/26/2022): Goal Met    3.  Patient will achieve at least 100 degrees of left shoulder flexion to hang clothes in closet.   Status at last PN: Not Tested  Status at PN (5/26/2022): 78    4   Patient will improve ability to use left hand as assist as shown by increase in FOTO of at least 10 points  Status at PN (5/26/2022):   Not reassed at this time    PLAN  []  Upgrade activities as tolerated     [x]  Continue plan of care  []  Update interventions per flow sheet       []  Discharge due to:_  []  Other:_      SANDY Harris  5/26/2022  11:00 AM        Future Appointments   Date Time Provider Janis Conteh   5/26/2022  1:30 PM Ananth Franz EJJLJIU [facility]   5/26/2022  2:15 PM Farrah aMrtins, PT CWXSGIS [facility]   5/31/2022  1:30 PM Ananth Franz VDSBVHL [facility]   5/31/2022  2:15 PM Deliliah Pitch, PTA MMCPTPB [facility]   6/2/2022  1:30 PM Deliliah Pitch, PTA MMCPTPB [facility]   6/2/2022  2:15 PM Ananth Franz CXLMRQO [facility]   8/18/2022  2:00 PM [facility]

## 2022-05-26 NOTE — PROGRESS NOTES
PT DAILY TREATMENT NOTE     Patient Name: Danielle Graff  Date:2022  : 1970  [x]  Patient  Verified  Payor: [facility] MEDICARE / Plan: [address] / Product Type: Managed Care Medicare /    In time:2:15P  Out time:2:56P  Total Treatment Time (min): 42  Visit #: 6 of 8    Medicare/BCBS Only   Total Timed Codes (min):  42 1:1 Treatment Time:  42       Treatment Area: Lower extremity weakness [R29.898]  Abnormal gait [R26.9]    SUBJECTIVE  Pain Level (0-10 scale): \"no\"  Any medication changes, allergies to medications, adverse drug reactions, diagnosis change, or new procedure performed?: [x] No    [] Yes (see summary sheet for update)  Subjective functional status/changes:   [] No changes reported      Patient's mothers reports overall improvement since start of care. She would like him to continue for at least 4 more visits.  Patient sister reports patient continues to ambulate with decreased left foot clearance, will sit in chairs at quarter turn to left, and does not tend to use left UE.    OBJECTIVE      42 min Therapeutic Activity:  []  See flow sheet : goal assessment, floor transfer, family education   Rationale: increase ROM, increase strength, improve coordination, improve balance and increase proprioception  to improve the patients ability to perform ADLs with increased ease and independence          With   [] TE   [] TA   [] neuro   [] other: Patient Education: [x] Review HEP    [] Progressed/Changed HEP based on:   [] positioning   [] body mechanics   [] transfers   [] heat/ice application    [] other:      Other Objective/Functional Measures:     Cone finding in gym: max A direction to locate 4 orange cones placed at various heights  Patient required max encouragement to ambulate between machines to reach cones  Last cone placed under chair  Patient able to descend to floor to attempt to reach cone  Max A and patient increasingly fearful of performing standing with attempts to lie on floor in response  Attempted having patient turn to side sitting, push up from semi-reclined position, and achieve long sitting to turn to reach for table/chair to pull up on  Patient only able to stand following max A for sitting in long sitting and with therapist holding B hands to assist with pulling from sitting with knees flexed position with mod A  Attempted to have patient ambulate in clinic while holding basketball and navigate low jennifer, 4\" step, and airex  Patient attempted to step through jennifer and then ambulate around step and airex  After placing ball down patient attempted to clear jennifer but with wide step length and attempted to step onto airex with only right LE; stepped onto 4\" step forwards but descended sideways     Pain Level (0-10 scale) post treatment: \"no\"    ASSESSMENT/Changes in Function: SEE RECERTIFICATION     Patient will continue to benefit from skilled PT services to modify and progress therapeutic interventions, address functional mobility deficits, address ROM deficits, address strength deficits, analyze and address soft tissue restrictions, analyze and cue movement patterns, analyze and modify body mechanics/ergonomics, assess and modify postural abnormalities, address imbalance/dizziness and instruct in home and community integration to attain remaining goals. []  See Plan of Care  [x]  See progress note/recertification  []  See Discharge Summary         Progress towards goals / Updated goals:    SEE RECERTIFICATION FOR UPDATED GOALS      1. Patient will improve FOTO score by at least 5 points in order to demonstrate functional improvement. Unable to formally assess              2. Patient will report \"no difficulty\" with \"Walk[ing] around 1 level (floor) of your home\" with FOTO in order to demonstrate improved tolerance to daily tasks.  Unable to formally assess               3. Patient will navigate at least 4 stairs with no more than right handrail and supervision in order to navigate home with increase ease. Progressing negotiated 4 stairs with B UE and min-mod verbal encouragement and CGA (5/6/22)               4. Patient will be able to perform floor transfer with no more than mod A in order to perform ADLs with increased ease. Progressing-mod to max A 5/26/22    PLAN  [x]  Upgrade activities as tolerated     [x]  Continue plan of care  []  Update interventions per flow sheet       []  Discharge due to:_  []  Other:_      Frankie Townsend, PT 5/26/2022  2:24 PM    Future Appointments   Date Time Provider Janis Conteh   5/31/2022  1:30 PM Climmie Counts [id number] [facility]   5/31/2022  2:15 PM Bettyann Doctor, PTA MMCPTPB [facility]   6/2/2022  1:30 PM Betty Doctor, PTA MMCPTPB [facility]   6/2/2022  2:15 PM Climmie Counts PLBJABC [facility]   8/18/2022  2:00 PM Gen Austin, [address]

## 2022-05-26 NOTE — PROGRESS NOTES
[facility] [employer] [facility]  JADYN  [address] [place] [address]  [phone] fax    Continued Plan of Care/ Re-certification for Physical Therapy Services    Patient name: Isabela Malhotra Start of Care: 3/21/2022   Referral source: Jennifer Uribe : 1970   Medical/Treatment Diagnosis: Lower extremity weakness [R29.898]  Abnormal gait [R26.9]  Payor: [employer] MEDICARE / Plan: [email or web address] / Product Type: Managed Care Medicare /  Onset Date:2021        Prior Hospitalization: see medical history Provider#: [id number]   Medications: Verified on Patient Summary List    Comorbidities: Down syndrome, aortic valve replacement, corneal transplant, colon resection, right CVA  Prior Level of Function:lives in 1 story home with family (mother, father, and sister-caregiver)                           Visits from Start of Care: 16   Missed Visits: 1    The Plan of Care and following information is based on the patient's current status:  Goal: Patient will improve FOTO score by at least 5 points in order to demonstrate functional improvement. Status at last note/certification: [id number]  Current Status: Not formally assessed    Goal: Patient will report \"no difficulty\" with \"Walk[ing] around 1 level (floor) of your home\" with FOTO in order to demonstrate improved tolerance to daily tasks. Status at last note/certification: Progressing-\"little difficulty\"  Current Status:Not formally assessed    Goal: Patient will navigate at least 4 stairs with no more than right handrail and supervision in order to navigate home with increase ease.   Status at last note/certification: Progressing-mother reports standby to [address] with use of right handrail with reciprocal pattern to ascend and step to pattern to descend; at time of therapy visit: able to ascend forwards with reciprocal pattern and right handrail with CGA; descend facing left handrail using BUEs with step to pattern with CGA to mod A to prevent falling  Current Status: Progressing-able to ascend with reciprocal pattern and right handrail and standby assist to CGA; descends sideways facing and using right handrail    Goal: Patient will be able to perform floor transfer with no more than mod A in order to perform ADLs with increased ease. Status at last note/certification: New goal  Current Status: Progressing-mod A to max A    Key functional changes: increased independence with stair transfers, increasing independence with floor transfers, improved gait     Problems/ barriers to goal attainment: cognitive deficits, decreased left sided awareness    Problem List: pain affecting function, decrease ROM, decrease strength, impaired gait/ balance, decrease ADL/ functional abilitiies, decrease activity tolerance, decrease flexibility/ joint mobility and decrease transfer abilities    Treatment Plan: Therapeutic exercise, Therapeutic activities, Neuromuscular re-education, Physical agent/modality, Gait/balance training, Manual therapy, Patient education, Self Care training, Functional mobility training, Home safety training and Stair training     Patient Goal (s) has been updated and includes: \"to get better\"     Goals for this certification period to be accomplished in 4 weeks:    1. Patient will navigate at least 4 stairs with no more than right handrail and supervision in order to navigate home with increase ease. 2. Patient will be able to perform floor transfer with no more than mod A in order to perform ADLs with increased ease. 3. Patient will be able to navigate curb with no more than standby assist in order to navigate community with increased ease and independence. Frequency / Duration: Patient to be seen 2 times per week for 4 weeks:    Assessment / Recommendations: Patient continues to progress with skilled outpatient PT.  Patient's mother reports overall improvement since start of care with patient demonstrating increased independence with ambulating in home and donning/doffing seatbelt. Patient's sister reports patient continues to ambulate with decreased left foot clearance, will sit in chairs at quarter turn to left, and does not tend to use left UE. Patient has demonstrated improved awareness of left side but this will vary session to session. Patient also demonstrating improved response time with hitting beach ball. He continues to require mod to max verbal and visual cues for direction to locate objects and navigate therapy clinic. He demonstrates decreased overall endurance and fatigues with more challenging activities. Patient remains hesitant/fearful of ambulating in more narrow spaces and navigating obstacles involving stepping over objects/uneven surfaces. Patient continues to attempt to use right UE for support and reaching tasks but will do so with left UE if right hand held by therapist. Patient ambulating with improved step step height/foot clearance but continues to ambulate with wide RAMO and decreased speed. He continues to have difficulty navigating stairs requiring unilateral to B UE support and max encouragement due to fear with suspected visual component and unfamiliar setting as likely contributing factors vs strength deficit/inability to complete tasks as patient has difficulty with clearing hurdles/objects of various heights. Patient continues to have difficulty with sequencing/command following due to fear with floor transfers as he is able to descend with CGA but requires mod to max A to complete standing (with max A to achieve long sitting and mod A with therapist holding B hands to assist with pulling from sitting with knees flexed position). Patient would benefit from continued skilled outpatient PT to address increased use of left LE, increase proprioceptive awareness, and progress functional deficits to further increase independence at home.      Certification Period: 5/27/22-6/25/22    Ramón Ames, PT 5/26/2022 7:53 PM    ________________________________________________________________________  I certify that the above Therapy Services are being furnished while the patient is under my care. I agree with the treatment plan and certify that this therapy is necessary. [] I have read the above and request that my patient continue as recommended.   [] I have read the above report and request that my patient continue therapy with the following changes/special instructions: _______________________________________  [] I have read the above report and request that my patient be discharged from therapy    Physician's Signature:____________Date:_________TIME:________     Ruchi Krishna PA-C  ** Signature, Date and Time must be completed for valid certification **    Please sign and return to [facility] - [facility] JADYN  [address] [place] [address]  [phone] fax

## 2022-05-26 NOTE — PROGRESS NOTES
[facility] [place] [employer] [facility] [employer] JADYN  [address] [facility]  [phone] fax    Continued Plan of Care/ Re-certification for Occupational Therapy Services    Patient name: Annetta Mccormack Start of Care: 3/28/22   Referral source: Tova Hurtado : 1970   Medical/Treatment Diagnosis: Weakness of left upper extremity [R29.898]  Payor: Usman Spann / Plan: [address] / Product Type: Managed Care Medicare /  Onset Date:21     Prior Hospitalization: see medical history Provider#: [id number]   Medications: Verified on Patient Summary List    Comorbidities: Downs syndrome, aortic valve replacement, corneal transplant, colon   Prior Level of Function: Independent self care, helped fold laundry, put away groceries, could make sandwich                               Visits from Start of Care: 14    Missed Visits: 1    The Plan of Care and following information is based on the patient's current status:    1.  Mother will be independent in proper procedure for left UE ROM. Status at PN (2022):  Goal Met per mother and sister report     Concepcion Chen will be able to reach to place items with left hand with 50% accuracy to shoulder level. Status Last PN: progressing with cone stakcing and saebo tree  Status at PN (2022):  Goal Met with velcro poker chips and left hand     3.  Patient will be supervision level for left hand strengthening. Status at Last PN: unable to get patient to use putty with left hand  Status at PN (2022): Max A     4.  Patient will be supervision level for AAROM ( bimanual ) tasks  Status at Last PN: ongoing cues to use left hand  Status at PN (2022):  Cueing required      Long Term Goals: To be accomplished in 4 weeks:              1.  Patient will be able to don and doff all clothing with minimal assist for safety.   Status at Last PN: able to don and doff shorts with supervision  Status at PN (5/26/2022): Mod A per sister report     2. Peg Bucker will be able to fold laundry using left hand assist.  Status at Last PN: hand over hand or cueing required  Current Status (5/26/2022): Goal Met     3.  Patient will achieve at least 100 degrees of left shoulder flexion to hang clothes in closet. Status at last PN: Not Tested  Status at PN (5/26/2022): 78     4   Patient will improve ability to use left hand as assist as shown by increase in FOTO of at least 10 points  Status at PN (5/26/2022): Not reassed at this time    Key functional changes: Functional use of Left UE improving, decreased need for cueing for left hand incorporation needed      Problems/ barriers to goal attainment: cognitive impairment, visual field       Treatment Plan: Therapeutic exercise, Therapeutic activities, Neuromuscular re-education, Manual therapy, Patient education and ADLs/IADLs      Patient Goal (s) has been updated and includes: more use of hand\"    Goals for this certification period to be accomplished in 4 weeks:     3.  Patient will be supervision level for left hand strengthening. Status at PN (5/26/2022): Max A     4.  Patient will be supervision level for AAROM ( bimanual ) tasks  Status at PN (5/26/2022):  Cueing required      Long Term Goals: To be accomplished in 4 weeks:              1.  Patient will be able to don and doff all clothing with minimal assist for safety. Status at PN (5/26/2022): Mod A per sister report     3.  Patient will achieve at least 100 degrees of left shoulder flexion to hang clothes in closet. Status at PN (5/26/2022): 78     4   Patient will improve ability to use left hand as assist as shown by increase in FOTO of at least 10 points  Status at PN (5/26/2022):   Not reassed at this time    Frequency / Duration: Patient to be seen 2 times per week for 4 weeks:    Assessment / Recommendations:Patient will benefit from continued skilled occupational therapy to increase upper extremity function and functional independence. Certification Period: 5/28/22-6/26/22    SANDY Ballesteros  5/26/2022 12:46 PM    ________________________________________________________________________  I certify that the above Therapy Services are being furnished while the patient is under my care. I agree with the treatment plan and certify that this therapy is necessary.       [de-identified] Signature:____________Date:_________TIME:________     Alexander Frederick PA-C  ** Signature, Date and Time must be completed for valid certification **      Please sign and return to [facility] [employer] [facility]  [address] [facility]            [phone] fax

## 2022-05-31 ENCOUNTER — HOSPITAL ENCOUNTER (OUTPATIENT)
Dept: PHYSICAL THERAPY | Age: 52
Discharge: HOME | End: 2022-05-31
Payer: MEDICARE

## 2022-05-31 PROCEDURE — 97530 THERAPEUTIC ACTIVITIES: CPT

## 2022-05-31 PROCEDURE — 97535 SELF CARE MNGMENT TRAINING: CPT

## 2022-05-31 PROCEDURE — 97110 THERAPEUTIC EXERCISES: CPT

## 2022-05-31 NOTE — PROGRESS NOTES
PT DAILY TREATMENT NOTE     Patient Name: Lonell Schilder  Date:2022  : 1970  [x]  Patient  Verified  Payor: [facility] MEDICARE / Plan: [address] / Product Type: Managed Care Medicare /    In time: 2:15 Out time: 2:56  Total Treatment Time (min): 41  Visit #: 1 of 8    Medicare/BCBS Only   Total Timed Codes (min):  41 1:1 Treatment Time: 41       Treatment Area: Lower extremity weakness [R29.898]  Abnormal gait [R26.9]    SUBJECTIVE  Pain Level (0-10 scale): \"no\"  Any medication changes, allergies to medications, adverse drug reactions, diagnosis change, or new procedure performed?: [x] No    [] Yes (see summary sheet for update)  Subjective functional status/changes:   [] No changes reported  Mom reports son is doing better with seatbelt and walking. She states he wouldn't get on the floor before and has never picked up his feet walking. She states working on the stairs is helpful. She has noticed improvements overall at home and was told originally to not expect improvements for a year.        OBJECTIVE    41 min Therapeutic Activity:  [x]  See flow sheet : stairs, floor<>sit; sit<>right side sit   Rationale: increase ROM, increase strength, improve coordination and increase proprioception  to improve the patients ability to perform ADLs with increased ease             With   [] TE   [] TA   [] neuro   [] other: Patient Education: [x] Review HEP    [] Progressed/Changed HEP based on:   [] positioning   [] body mechanics   [] transfers   [] heat/ice application    [] other:      Other Objective/Functional Measures:  Max cueing on stairs during descent  1st attempt- step to pattern ascending and side stepping descending with right railing  2nd attempt- step to pattern ascending and step to pattern descending forward facing with verbal cues and encouragement  Pt states \"no\" when asked is it hard to see  Increased respiration and fear with attempt at sit>floor transfer; Max A to place pt on the floor with gait belt around waist and Max A to lift pt back to sitting on the plinth  Pt able to perform sit to right side sitting but increased fear working towards quadruped to work on crawling    Pain Level (0-10 scale) post treatment: \"no\"     ASSESSMENT/Changes in Function: Pt progressing towards stairs but increased fear with turning to descend forward using step to pattern and HR. He requires Max A for plinth to floor transfer due to cognition of understanding task and how to perform but was not performing prior to the stroke per his mom. Will continue to progress outdoor ambulation and curb negotiation for improved safety and decreased fall risk. Patient will continue to benefit from skilled PT services to modify and progress therapeutic interventions, address functional mobility deficits, address ROM deficits, address strength deficits, analyze and address soft tissue restrictions, analyze and cue movement patterns, analyze and modify body mechanics/ergonomics, assess and modify postural abnormalities, address imbalance/dizziness and instruct in home and community integration to attain remaining goals. Goals for this certification period to be accomplished in 4 weeks:   1. Patient will navigate at least 4 stairs with no more than right handrail and supervision in order to navigate home with increase ease. 2. Patient will be able to perform floor transfer with no more than mod A in order to perform ADLs with increased ease. 3. Patient will be able to navigate curb with no more than standby assist in order to navigate community with increased ease and independence.     PLAN  [x]  Upgrade activities as tolerated     [x]  Continue plan of care  []  Update interventions per flow sheet       []  Discharge due to:_  []  Other:_      Ruth Minor PTA 5/31/2022  8:14 AM    Future Appointments   Date Time Provider Janis Conteh   5/31/2022  1:30 PM Emily Yoo [id number] [facility]   5/31/2022  2:15 PM Grecia Barker, PTA [id number] [facility]   6/2/2022  1:30 PM Grecia Barker, PTA [id number]   6/2/2022  2:15 PM Emily Yoo [id number] [facility]   8/18/2022  2:00 PM GERA Rossi

## 2022-05-31 NOTE — PROGRESS NOTES
OT DAILY TREATMENT NOTE 10-18    Patient Name: Magnus Emmanuel  Date:2022  : 1970  [x]  Patient  Verified  Payor: [facility] MEDICARE / Plan: [address] / Product Type: Managed Care Medicare /    In time:130  Out time:215  Total Treatment Time (min): 45  Visit #: 1 of 8    Medicare/BCBS Only   Total Timed Codes (min):  45 1:1 Treatment Time:  39     Treatment Area: Weakness of left upper extremity [R29.898]    SUBJECTIVE  Pain Level (0-10 scale): 0/10  Any medication changes, allergies to medications, adverse drug reactions, diagnosis change, or new procedure performed?: [x] No    [] Yes (see summary sheet for update)  Subjective functional status/changes:   [] No changes reported  Having a good day per mother    OBJECTIVE    8 min Therapeutic Exercise:  [] See flow sheet :   Rationale: increase ROM and increase strength to improve the patients ability to reach, move left UE freely     Shrugs/Retraction with Assist from BAY    25 min Therapeutic Activity:  []  See flow sheet :   Rationale: increase ROM, increase strength and improve coordination  to improve the patients ability to functionally use Left UE    Throwing stressballs into basket with Left UE in stand   Washers picked up from table with left and placed onto dowel  Nuts and bolts - B Hands      12 min Self Care/Home Management: Home Care   Rationale: increase ROM, increase strength and improve coordination  to improve the patients ability to perform self and home care tasks with increased independence     Shirt folding at table level   Unbuttoning- B Hands at table level      With   [] TE   [] TA   [] neuro   [] other: Patient Education: [x] Review HEP    [] Progressed/Changed HEP based on:   [] positioning   [] body mechanics   [] transfers   [] heat/ice application   [] Splint wear/care   [] Sensory re-education   [] scar management      [] other:            Other Objective/Functional Measures: Decreased volitional use of Left UE    Subluxation present- able to be corrected with shrugs/retraction    Pain Level (0-10 scale) post treatment: 0/10    ASSESSMENT/Changes in Function: decreased volitionally use noted on this date with increased subluxation since last visist     Patient will continue to benefit from skilled OT services to modify and progress therapeutic interventions, address ROM deficits, address strength deficits and instruct in home and community integration to attain remaining goals. []  See Plan of Care  []  See progress note/recertification  []  See Discharge Summary         Progress towards goals / Updated goals:  3.  Patient will be supervision level for left hand strengthening. Status at PN (5/26/2022):  Max A     4.  Patient will be supervision level for AAROM ( bimanual ) tasks  Status at PN (5/26/2022):  Cueing required      Long Term Goals: To be accomplished in 4 weeks:              1.  Patient will be able to don and doff all clothing with minimal assist for safety. Status at PN (5/26/2022): Mod A per sister report     3.  Patient will achieve at least 100 degrees of left shoulder flexion to hang clothes in closet.   Status at PN (5/26/2022): 78     4   Patient will improve ability to use left hand as assist as shown by increase in FOTO of at least 10 points  Status at PN (5/26/2022):  Not reassed at this time    PLAN  []  Upgrade activities as tolerated     [x]  Continue plan of care  []  Update interventions per flow sheet       []  Discharge due to:_  []  Other:_      SHANIQUE Peterson/L  5/31/2022  11:32 AM        Future Appointments   Date Time Provider Janis Conteh   5/31/2022  1:30 PM Luis F Acevedo [id number] [facility]   5/31/2022  2:15 PM Para Proffer, PTA [id number]   6/2/2022  1:30 PM Para Proffer, PTA [id number]   6/2/2022  2:15 PM Luis F Acevedo [id number] [facility]   8/18/2022  2:00 PM Bryant Patel, [address]

## 2022-06-02 ENCOUNTER — HOSPITAL ENCOUNTER (OUTPATIENT)
Dept: PHYSICAL THERAPY | Age: 52
Discharge: HOME | End: 2022-06-02
Payer: MEDICARE

## 2022-06-02 PROCEDURE — 97530 THERAPEUTIC ACTIVITIES: CPT

## 2022-06-02 NOTE — PROGRESS NOTES
OT DAILY TREATMENT NOTE 10-18    Patient Name: Miquel Rodriguez  Date:2022  : 1970  [x]  Patient  Verified  Payor: [facility] MEDICARE / Plan: [address] / Product Type: Managed Care Medicare /    In time:215  Out time:300  Total Treatment Time (min): 45  Visit #: 2 of 8    Medicare/BCBS Only   Total Timed Codes (min):  45 1:1 Treatment Time:  39     Treatment Area: Weakness of left upper extremity [R29.898]    SUBJECTIVE  Pain Level (0-10 scale): 0/10  Any medication changes, allergies to medications, adverse drug reactions, diagnosis change, or new procedure performed?: [x] No    [] Yes (see summary sheet for update)  Subjective functional status/changes:   [] No changes reported  Pt mother requesting next visit be discharge    OBJECTIVE    45 min Therapeutic Activity:  []  See flow sheet :   Rationale: increase ROM, increase strength and improve coordination  to improve the patients ability to buckle seat belt    Connect 4: using left hand 3 pt tip pinch to place pieces into vertical gameboard   Soft Theraputty: with small tools using left hand to press into soft putty   [place] : Picked up from table with left and pressed into bank slot   Buckle lanyard: B Hands      With   [] TE   [] TA   [] neuro   [] other: Patient Education: [x] Review HEP    [] Progressed/Changed HEP based on:   [] positioning   [] body mechanics   [] transfers   [] heat/ice application   [] Splint wear/care   [] Sensory re-education   [] scar management      [] other:            Other Objective/Functional Measures:     Hand over hand assist for buckle task      Pain Level (0-10 scale) post treatment: 0/10    ASSESSMENT/Changes in Function: improved Left Shoulder ROM    Patient will continue to benefit from skilled OT services to modify and progress therapeutic interventions, address ROM deficits, address strength deficits and instruct in home and community integration to attain remaining goals. []  See Plan of Care  []  See progress note/recertification  []  See Discharge Summary         Progress towards goals / Updated goals:  3.  Patient will be supervision level for left hand strengthening. Status at PN (5/26/2022):  Max A     4.  Patient will be supervision level for AAROM ( bimanual ) tasks  Status at PN (5/26/2022):  Cueing required      Long Term Goals: To be accomplished in 4 weeks:              1.  Patient will be able to don and doff all clothing with minimal assist for safety. Status at PN (5/26/2022): Mod A per sister report     3.  Patient will achieve at least 100 degrees of left shoulder flexion to hang clothes in closet.   Status at PN (5/26/2022): 78     4   Patient will improve ability to use left hand as assist as shown by increase in FOTO of at least 10 points  Status at PN (5/26/2022):  Not reassed at this time    PLAN  []  Upgrade activities as tolerated     [x]  Continue plan of care  []  Update interventions per flow sheet       []  Discharge due to:_  []  Other:_      SANDY Downing  6/2/2022  11:43 AM        Future Appointments   Date Time Provider Janis Conteh   6/2/2022  1:30 PM Inessa Juarez PTA [id number]   6/2/2022  2:15 PM Desiree Barrientos [id number] [facility]   8/18/2022  2:00 PM GERA Self

## 2022-06-02 NOTE — PROGRESS NOTES
PT DAILY TREATMENT NOTE     Patient Name: Tramaine Durham  Date:2022  : 1970  [x]  Patient  Verified  Payor: [employer] MEDICARE / Plan: [address] / Product Type: Managed Care Medicare /    In time: 1:30 Out time: 2:10  Total Treatment Time (min): 40  Visit #: 2 of 8    Medicare/BCBS Only   Total Timed Codes (min): 40 1:1 Treatment Time: 40       Treatment Area: Lower extremity weakness [R29.898]  Abnormal gait [R26.9]    SUBJECTIVE  Pain Level (0-10 scale): \"no\"  Any medication changes, allergies to medications, adverse drug reactions, diagnosis change, or new procedure performed?: [x] No    [] Yes (see summary sheet for update)  Subjective functional status/changes:   [] No changes reported  Pt states \"no\" when asked if he has pain. See previous note discussion with mom for any further subjective information.     OBJECTIVE    40 min Therapeutic Activity:  [x]  See flow sheet : step ups,  sit<>right side sit; ball tosses; laundry basket carries; quadruped   Rationale: increase ROM, increase strength, improve coordination and increase proprioception  to improve the patients ability to perform ADLs with increased ease             With   [] TE   [] TA   [] neuro   [] other: Patient Education: [x] Review HEP    [] Progressed/Changed HEP based on:   [] positioning   [] body mechanics   [] transfers   [] heat/ice application    [] other:      Other Objective/Functional Measures:  Initiated session working towards quadruped; pt did well with B UEs on large mat, then lowered the mat and had the pt put his knees on the mat with tactile cueing and hold x 4 bouts    Next worked on step up and overs with tactile cueing to assist with turning around in the //  And tactile cues to step up with the left LE for correct foot placement    Pt able to utilize B UEs for laundry basket carries and for using the left UE to assist the right with ball tosses without additional verbal cueing    Pain Level (0-10 scale) post treatment: \"no\"     ASSESSMENT/Changes in Function: Pt progressing in therapy with increased utilization of the left side with daily activities but still needs cues during ambulation to be aware of the left side with scanning. He is progressing with ability to achieve quadruped position but has not advanced to crawling yet. He has improved ease of step ups but gets fearful at the top of the stairs with turning around to come back down. Will continue with specific functional limitations and progress as able to decrease caregiver burden at home with ADLs. Patient will continue to benefit from skilled PT services to modify and progress therapeutic interventions, address functional mobility deficits, address ROM deficits, address strength deficits, analyze and address soft tissue restrictions, analyze and cue movement patterns, analyze and modify body mechanics/ergonomics, assess and modify postural abnormalities, address imbalance/dizziness and instruct in home and community integration to attain remaining goals. Goals for this certification period to be accomplished in 4 weeks:   1. Patient will navigate at least 4 stairs with no more than right handrail and supervision in order to navigate home with increase ease. 2. Patient will be able to perform floor transfer with no more than mod A in order to perform ADLs with increased ease. Progressed quadruped (6/2/22)  3. Patient will be able to navigate curb with no more than standby assist in order to navigate community with increased ease and independence.     PLAN  [x]  Upgrade activities as tolerated     [x]  Continue plan of care  []  Update interventions per flow sheet       []  Discharge due to:_  []  Other:_      Fantasma Gonzalez PTA 6/2/2022  8:14 AM    Future Appointments   Date Time Provider Janis Conteh   6/2/2022  1:30 PM [facility]oe [facility] [address]   6/2/2022  2:15 PM Austen MELÉNDEZ [facility]   8/18/2022  2:00 PM Tierney Cleaning PA-C [id number] [facility]

## 2022-06-15 ENCOUNTER — HOSPITAL ENCOUNTER (OUTPATIENT)
Dept: PHYSICAL THERAPY | Age: 52
Discharge: HOME | End: 2022-06-15
Payer: MEDICARE

## 2022-06-15 PROCEDURE — 97530 THERAPEUTIC ACTIVITIES: CPT

## 2022-06-15 PROCEDURE — 97535 SELF CARE MNGMENT TRAINING: CPT

## 2022-06-15 PROCEDURE — 97112 NEUROMUSCULAR REEDUCATION: CPT

## 2022-06-15 PROCEDURE — 97110 THERAPEUTIC EXERCISES: CPT

## 2022-06-15 NOTE — PROGRESS NOTES
OT DAILY TREATMENT NOTE 10-18    Patient Name: Tnoja Joseph  Date:6/15/2022  : 1970  [x]  Patient  Verified  Payor: [employer] MEDICARE / Plan: [employer] / Product Type: Managed Care Medicare /    In time:130  Out time:212  Total Treatment Time (min): 42  Visit #: 3 of 8    Medicare/BCBS Only   Total Timed Codes (min):  42 1:1 Treatment Time:  42     Treatment Area: Weakness of left upper extremity [R29.898]    SUBJECTIVE  Pain Level (0-10 scale): 0/10  Any medication changes, allergies to medications, adverse drug reactions, diagnosis change, or new procedure performed?: [x] No    [] Yes (see summary sheet for update)  Subjective functional status/changes:   [] No changes reported  Pt caregiver/mother reporting overall improvement at home, however, pt still requiring Mod A for dressing tasks .     OBJECTIVE  17 min Therapeutic Exercise:  [] See flow sheet :   Rationale: increase ROM and increase strength to improve the patients ability to , Reach    Recheck for DC  DC HEP/Instructions with Mother/Caregiver  Soft theraputty: Manipulated with B Hands to reveal/remove 1/4 in pegs 10/10     15 min Therapeutic Activity:  []  See flow sheet :   Rationale: increase ROM  to improve the patients ability to reach    Recheck for DC  DC HEP/Instructions with Mother/Caregiver  [place]- Left UE- Reach and place on 3rd lv    10 min Self Care/Home Management: Dressing   Rationale: increase ROM, increase strength and improve coordination  to improve the patients ability to improved functional independence with self care tasks     Recheck for DC  DC HEP/Instructions with Mother/Caregiver        With   [] TE   [] TA   [] neuro   [] other: Patient Education: [x] Review HEP    [] Progressed/Changed HEP based on:   [] positioning   [] body mechanics   [] transfers   [] heat/ice application   [] Splint wear/care   [] Sensory re-education   [] scar management      [] other: Other Objective/Functional Measures:     Left Shoulder Flexion: 90 (78)            Pain Level (0-10 scale) post treatment: 0/10    ASSESSMENT/Changes in Function: Ongoing cueing required to involve Left UE into tasks, with prompting patient able to use Left UE in functional manner but requires prompting. Improved AROM in left Shoulder flexion. Caregiver/Mother education on discharge HEP/Instructions. []  See Plan of Care  []  See progress note/recertification  [x]  See Discharge Summary         Progress towards goals / Updated goals:  3.  Patient will be supervision level for left hand strengthening. Status at PN (5/26/2022):  Max A  Status at Discharge (6/15/2022): Goal Met     4.  Patient will be supervision level for AAROM ( bimanual ) tasks  Status at PN (5/26/2022):  Cueing required   Status at Discharge (6/15/2022): Goal Met     Long Term Goals: To be accomplished in 4 weeks:              1.  Patient will be able to don and doff all clothing with minimal assist for safety. Status at PN (5/26/2022): Mod A per sister report  Status at Discharge (6/15/2022): Mod A per caregiver/Mother      3.  Patient will achieve at least 100 degrees of left shoulder flexion to hang clothes in closet.   Status at PN (5/26/2022): 78  Status at Discharge (6/15/2022): 90, Improvement from last progress not however goal not met at time of discharge     4   Patient will improve ability to use left hand as assist as shown by increase in FOTO of at least 10 points  Status at PN (5/26/2022):  Not reassed at this time  Status at Discharge (6/15/2022): Goal discontinued     PLAN  []  Upgrade activities as tolerated     []  Continue plan of care  []  Update interventions per flow sheet       [x]  Discharge due to:_Progressing towards goals   []  Other:_      SANDY Ceja  6/15/2022  12:04 PM        Future Appointments   Date Time Provider Janis Conteh   8/18/2022  2:00 PM Therese Villa PA-C [facility] [address]

## 2022-06-15 NOTE — PROGRESS NOTES
[facility] - [address]. [facility]  [phone] fax      Patient Name: Arely Nath  : 1970      Occupational Therapy Discharge Instructions        Continue with home exercise program for 1-3 times a day for 6 weeks then decrease to 1-3 times a day as needed/patient discretion. Continue with    [] Ice  as needed  times per day     [] Heat           Follow up with MD:     [] Upon completion of therapy     [x] As needed      Recommendations:    []   Return to activity with home program                    []  Return to activity with the following modifications :                              []  Practice Hand coordination activities                                      []  Wear Splint as prescribed:                    [] Return to/Join local gym        Additional comments:    - [address] okay but encourage left to perform    - Gross Motor Tasks (Both Hands)    Please call with any questions or concerns. Thank you for your participation.          SANDY Moore  6/15/2022  12:07 PM

## 2022-06-15 NOTE — PROGRESS NOTES
PT DAILY TREATMENT NOTE     Patient Name: Laura Centeno  Date:6/15/2022  : 1970  [x]  Patient  Verified  Payor: [facility] MEDICARE / Plan: [address] / Product Type: Managed Care Medicare /    In time: 2:15 Out time: 2:45  Total Treatment Time (min): 30  Visit #: 3 of 8    Medicare/BCBS Only   Total Timed Codes (min): 30 1:1 Treatment Time: 30       Treatment Area: Lower extremity weakness [R29.898]  Abnormal gait [R26.9]    SUBJECTIVE  Pain Level (0-10 scale): \"no\"  Any medication changes, allergies to medications, adverse drug reactions, diagnosis change, or new procedure performed?: [x] No    [] Yes (see summary sheet for update)  Subjective functional status/changes:   [] No changes reported  Mom reports noticeable improvement in that Ricky is walking around the house more without assistance. She has noticed increased use of left side. She states stairs have improved. She brought in KT tape to see how to tape his shoulder for subluxation. They are both okay to D/C today. OBJECTIVE    30 min Therapeutic Activity:  [x]  See flow sheet : goal reassessment; KT taping education    Rationale: increase ROM, increase strength, improve coordination and increase proprioception  to improve the patients ability to perform ADLs with increased ease             With   [] TE   [] TA   [] neuro   [] other: Patient Education: [x] Review HEP    [] Progressed/Changed HEP based on:   [] positioning   [] body mechanics   [] transfers   [] heat/ice application    [] other:      Other Objective/Functional Measures:  HHA for curbs and step ups  Improved step length during gait  Discussed D/C with mom  Educated on KT and showed \"[employer]\" videos for references      Pain Level (0-10 scale) post treatment: \"no\"    ASSESSMENT/Changes in Function: Pt progressed overall towards final goals in therapy.  He continues to utilize [facility] for curb negotiation but likely due to some issues with awareness and/or depth perception for safety. His mother recognized improvement with stairs at home but still assists with CGA to SBA for safety. Further progression of floor<>stand was not administered due to pt increased fear with performing likely due to limited times performing prior to stroke and not recognizing normal functional patterns. He was Max A when performing in therapy but was also willing to progress to quadruped in therapy for full weightbearing in case of falls. He will D/C at this time due to improvement with functional mobility at home and family understanding of how to continue progression without further instruction from therapy. Goals for this certification period to be accomplished in 4 weeks:   1. Patient will navigate at least 4 stairs with no more than right handrail and supervision in order to navigate home with increase ease. Per mom report at home using handrail but still CGA (6/15/22)  2. Patient will be able to perform floor transfer with no more than mod A in order to perform ADLs with increased ease. Progressed quadruped (6/2/22) Max A for transfers due to increased fear (6/15/22)  3. Patient will be able to navigate curb with no more than standby assist in order to navigate community with increased ease and independence.  HHA for curbs (6/15/22)    PLAN  []  Upgrade activities as tolerated     [x]  Continue plan of care  []  Update interventions per flow sheet       [x]  Discharge due to: independent with functional progression at home and improved left sided awareness  []  Other:_      Janna Obregon PTA 6/15/2022  8:14 AM    Future Appointments   Date Time Provider Janis Conteh   6/15/2022  1:30 PM Warren Smiley [id number]   6/15/2022  2:15 PM Alicia Torres PTA [id number]   8/18/2022  2:00 PM James Shah, [address]

## 2022-06-16 NOTE — PROGRESS NOTES
[facility] COMPANY OF [address]  [phone] fax    Occupational Therapy Discharge Summary  Patient name: Luis Borden Start of Care: 3/28/22   Referral source: Stephanie Sanchez : 1970   Medical/Treatment Diagnosis: Weakness of left upper extremity [R29.898]  Payor: [employer] Distance / Plan: [address] / Product Type: [employer] Medicare /  Onset Date:21     Prior Hospitalization: see medical history Provider#: [id number]   Medications: Verified on Patient Summary List    Comorbidities:  Downs syndrome, aortic valve replacement, corneal transplant, colon resection  Prior Level of Function: Independent self care, helped fold laundry, put away groceries, could make sandwich                                 Visits from Start of Care: 17    Missed Visits: 1    Reporting Period : 22 to 6/15/22    Summary of Care:Patient seen for neuromuscular re-ed, therapeutic exercise and activities and ADL training ot improve function. Caregiver has HEP. Left Shoulder Flexion: 90     (was 78)        3.  Patient will be supervision level for left hand strengthening. Status at PN (2022):  Max A  Status at Discharge (6/15/2022): Goal Met     4.  Patient will be supervision level for AAROM ( bimanual ) tasks  Status at PN (2022):  Cueing required   Status at Discharge (6/15/2022): Goal Met     Long Term Goals: To be accomplished in 4 weeks:              1.  Patient will be able to don and doff all clothing with minimal assist for safety. Status at PN (2022): Mod A per sister report  Status at Discharge (6/15/2022): Mod A per caregiver/Mother      3.  Patient will achieve at least 100 degrees of left shoulder flexion to hang clothes in closet.   Status at PN (2022): 78  Status at Discharge (6/15/2022): 90, Improvement from last progress not however goal not met at time of discharge     4   Patient will improve ability to use left hand as assist as shown by increase in FOTO of at least 10 points  Status at PN (5/26/2022):  Not reassed at this time  Status at Discharge (6/15/2022): Goal discontinued     ASSESSMENT/Changes in Function: Ongoing cueing required to involve Left UE into tasks, with prompting patient able to use Left UE in functional manner but requires prompting. Improved AROM in left Shoulder flexion.  Caregiver/Mother education on discharge HEP/Instructions.             ASSESSMENT/RECOMMENDATIONS:  []Discontinue therapy: [x]Patient has reached or is progressing toward set goals      []Patient is non-compliant or has abdicated      []Due to lack of appreciable progress towards set goals    KASANDRA Mcnally/JOHNY 6/16/2022 4:54 PM

## 2022-07-18 NOTE — PROGRESS NOTES
[facility] - Delia Cohen  [address] [facility]  [phone] fax    Physical Therapy Discharge Summary    Patient name: Jacques Suárez Start of Care: 3/21/2022   Referral source: Melchor Bartholomew : 1970   Medical/Treatment Diagnosis: Lower extremity weakness [R29.898]  Abnormal gait [R26.9]  Payor: [employer] MEDICARE / Plan: [email or web address] / Product Type: Managed Care Medicare /  Onset Date:2021     Prior Hospitalization: see medical history Provider#: [id number]   Medications: Verified on Patient Summary List    Comorbidities: Down syndrome, aortic valve replacement, corneal transplant, colon resection, right CVA  Prior Level of Function:lives in 1 story home with family (mother, father, and sister-caregiver)    Visits from Start of Care: 19    Missed Visits: 0    Reporting Period : 22 to 6/15/22    Summary of Care:  Goal: Patient will navigate at least 4 stairs with no more than right handrail and supervision in order to navigate home with increase ease. Status at last note/certification: Progressing-able to ascend with reciprocal pattern and right handrail and standby assist to CGA; descends sideways facing and using right handrail   Status at discharge: NOT MET-right handrail with CGA per mother's report    Goal: Patient will be able to perform floor transfer with no more than mod A in order to perform ADLs with increased ease.    Status at last note/certification: Progressing-mod A to max A  Status at discharge: NOT MET-max A    Goal: Patient will be able to navigate curb with no more than standby assist in order to navigate community with increased ease and independence.   Status at last note/certification: New goal  Status at discharge: NOT MET-handheld assist      ASSESSMENT/RECOMMENDATIONS: Patient presented to skilled outpatient PT for 3 additional visits following date of last recertification (7/18/22). He continues to utilize [facility] for curb negotiation but likely due to some issues with awareness and/or depth perception for safety. His mother recognized improvement with stairs at home but still assists with CGA to SBA for safety. Further progression of floor<>stand was not administered due to pt increased fear with performing likely due to limited times performing prior to stroke and not recognizing normal functional patterns. He was Max A when performing in therapy but was also willing to progress to quadruped in therapy for full weightbearing in case of falls. He will D/C at this time due to improvement with functional mobility at home and family understanding of how to continue progression without further instruction from therapy.  At this time, patient is appropriate for discharge from skilled outpatient PT.    [x]Discontinue therapy: [x]Patient has reached or is progressing toward set goals      []Patient is non-compliant or has abdicated      []Due to lack of appreciable progress towards set goals    Oj Perez, PT 7/18/2022 2:47 PM

## 2022-11-01 ENCOUNTER — HOSPITAL ENCOUNTER (OUTPATIENT)
Dept: PHYSICAL THERAPY | Age: 52
Discharge: HOME | End: 2022-11-01
Payer: MEDICARE

## 2022-11-01 PROCEDURE — 97163 PT EVAL HIGH COMPLEX 45 MIN: CPT

## 2022-11-01 PROCEDURE — 92523 SPEECH SOUND LANG COMPREHEN: CPT

## 2022-11-01 PROCEDURE — 97110 THERAPEUTIC EXERCISES: CPT

## 2022-11-01 NOTE — PROGRESS NOTES
[facility] OF [facility]  [address] [facility]  [phone] fax    Plan of Care/ Statement of Necessity for Speech Therapy Services    Patient name: Alexi Nunez Start of Care: 2022   Referral source: Aaron Diaz MD : 1970    Medical Diagnosis: Dysarthria following unspecified cerebrovascular disease [I69.922]  Payor: [employer] / Plan: [address] / Product Type: Managed Care Medicare /  [place] EEMF:[id number]    Treatment Diagnosis: Dysarthria, aphasia   Prior Hospitalization: see medical history Provider#: [id number]   Medications: Verified on Patient summary List    Comorbidities: Down Syndrome, GERD, CVA    Prior Level of Function: decreased receptive/expressive language d/t Down syndrome. Per mother's report, prior to CVA patient communicated primarily in single words or short sentences. The Plan of Care and following information is based on the information from the initial evaluation. Assessment/ key information: The patient is a 46 y/o male who presents for a speech and language evaluation. Patient has Down Syndrome and is minimally verbal. His mother provided all information. Patient had CVA in 2021. His mother reports that afterwards he was able to talk, although they had some difficulty understanding him. However, she reports that now he rarely talks and communicates primarily through gesture. Patient has also reportedly been holding his throat and coughing during solid food intake. Patient has seen ENT and an MBS has been ordered. Oral mechanism exam was performed; patient had some difficulty following instructions. Lips and tongue are symmetrical. With heavy cues and models, he was able to perform SMRs, which were noted to be slow and not always correct. Voice was soft and breathy. Patient able to repeat single words and often produced single word stereotypies.  He was unable to label pictures. Overall, patient presents with suspected aphasia and dysarthria. He will benefit from skilled therapy to address deficits in expressive and receptive language. Pending MBS, treatment of swallowing may also be necessary. Patient's mother provided education on diagnosis and compensatory strategies. Problem List:      [x]aphasic  [x]dysarthric  []dysphagic       []alexic  []agraphic  []dysphonia       []dysfluency   []Cognitive-Linguistic Disorder       []other   Treatment Plan may include any combination of the following: Aphasia Treatment, Dysarthria Treatment, and Patient Education      Patient / Family readiness to learn indicated by: asking questions, trying to perform skills, and interest    Persons(s) to be included in education:   patient (P) and family support person (FSP);list mother    Barriers to Learning/Limitations: yes;  cognitive and other Down Syndrome    Patient Goal (s): less pain in throat and more independence    Patient Self Reported Health Status: fair    Rehabilitation Potential: good    Short Term Goals: To be accomplished in  4 weeks  1. The patient will complete oral motor exercises for increased speech intelligibility with 60% accuracy given mod-max visual and verbal cues. 2) Pt will complete graded expressive and receptive labeling tasks with 60% accuracy given modA in order  to promote verbalizations and word retrieval.      3) Patient will ID letters/numbers/basic CVC words with 70% accuracy to increase functional reading and communication. 4) Patient will follow 1-2 step directions targeting body part identification and the environment x80% accuracy to increase functional receptive language skills. 5. The patient will trial a variety of alternate and augmentative communication modes and devices in order to increase ability to express wants and needs. Long Term Goals: To be accomplished in 8 weeks   1.  The patient will use total communication (speech, assistive technology, pantomime/gesture) to express a variety of pragmatic functions in order to increase safety during ADLs and quality of life. Frequency / Duration: Patient to be seen 2 times per week for 8 weeks:    Patient/ Caregiver education and instruction: Diagnosis, prognosis, Swallowing Precautions,  Soft foods, swallow safety    Certification Period: 11/1/22-11/30/22    ARMOND Earl 11/1/2022 2:55 PM  ________________________________________________________________________    I certify that the above Therapy Services are being furnished while the patient is under my care. I agree with the treatment plan and certify that this therapy is necessary.     Physician's Signature:____________Date:_________TIME:________     Donna Land MD  ** Signature, Date and Time must be completed for valid certification **    Please sign and return to [facility] - [facility] COMPANY OF [facility]  [address] [place] [address]  [phone] fax     Thank you

## 2022-11-01 NOTE — PROGRESS NOTES
[facility] - [facility] [employer] COMPANY OF [facility]  [address] [facility]  [phone] fax    Plan of Care/ Statement of Necessity for Physical Therapy Services    Patient name: Maia Chinchilla Start of Care: 2022   Referral source: Naz Vinson MD : 1970    Medical Diagnosis: Hemiplegia, unspecified affecting unspecified side [G81.90]  Payor: Ban Chopra / Plan: [address] / Product Type: [employer] Care Medicare /  Onset Date:2021    Treatment Diagnosis: Bilateral LE Weakness/Gait and mobility   Prior Hospitalization: see medical history Provider#: [id number]   Medications: Verified on Patient summary List    Comorbidities: Down syndrome, aortic valve replacement, corneal transplant, colon resection, right CVA 2021   Prior Level of Function: Lives in 1 story home with family (mother, father, and sister-caregiver)     The Plan of Care and following information is based on the information from the initial evaluation. Assessment/ key information:  Patient is a 29-year-old RHD male who presents to therapy s/p right  CVA of 2021. Patient had home health therapy in January followed by Colin Ocasio from 3/2022-2022. Pt's Mother is present who reports there is still weakness in the left UE and LE, limitations with most functional activities that he has become Dependent. He is unable to report any pain due to Aphasia. Pt's mother reports he does not walk around as much in the house and sometimes he gets emotional. Pt will have an upcoming Scan as recommended by his Neurologist to detect if Mr Mariel Ramires had another Stroke since . Pt has possible left side neglect and requires heavy cues and tactile and verbal .Patient was independent with ambulation and most functional activities  prior to CVA, per his mother. He has difficulty and is unsafe with chair transfers, some stair navigation and ambulation/mobility.   Patient has not had any recent falls but demonstrates misstep/near LOB occasionally. Pt is a fall risk. Patient ambulates with decreased gait speed and shuffled steps; standby to [place] [id number] for safety and required encouragement with HHA to demo ambulation/ transfers and stairs during evaluation. Left shoulder AROM into elevation grossly limited, however pt was able to give Therapist a high five during evaluation. Right LE strength at least 3/5; left LE strength slightly diminished (3-/5). Unable to formally assess MMT due to patient with difficulty understanding testing sequencing. Patient required max verbal, visual, and tactile for instruction/setup with decreased cues needed for following reps of activities. Patient required heavy handheld assist and was able to step up/over a 4 inch step . Pt has visual /dept perception challenges which can inhibit his static and dynamic balance and community negotiation. Patient would benefit from skilled outpatient PT to address above mentioned deficits to improve functional mobility to increase safety with independence and ADLs, improve overall quality of life and reduce caregiver burden. Evaluation Complexity History HIGH Complexity :3+ comorbidities / personal factors will impact the outcome/ POC ; Examination HIGH Complexity : 4+ Standardized tests and measures addressing body structure, function, activity limitation and / or participation in recreation  ;Presentation HIGH Complexity : Unstable and unpredictable characteristics  ; Clinical Decision Making HIGH Complexity : FOTO score of 1- 25   Overall Complexity Rating: HIGH   Problem List: pain affecting function, decrease ROM, decrease strength, impaired gait/ balance, decrease ADL/ functional abilitiies, decrease activity tolerance, decrease flexibility/ joint mobility, decrease transfer abilities, and other cognition    Treatment Plan may include any combination of the following: Therapeutic exercise, Neuromuscular reeducation, Manual therapy, Therapeutic activity, Self care/home management, Electric stim unattended , and Gait training  Patient / Family readiness to learn indicated by: trying to perform skills, interest, and other demonstration/Verbal cueing  Persons(s) to be included in education: patient (P) and family support person (FSP);list Mother  Barriers to Learning/Limitations: yes;  cognitive and physical  Patient Goal (s): Relieve pain and more independence  Patient Self Reported Health Status: fair  Rehabilitation Potential: fair    Short Term Goals: To be accomplished in 1 weeks:   Goal: Pt will perform static visual tracking left > right activities to establish baseline for safe ambulation/ balance and activity tolerance. Status at evaluation/last progress note: Initiated left turns with waking. Long Term Goals: To be accomplished in 4 weeks:  Goal: Pt will increase FOTO  score by  7 pts to improve function. Status at evaluation/last progress note:FOTO= NA       2. Goal: Patient will be able to ascend/descend at least 4 steps x 1 with SBA/CGA in order to negotiate stairs at his home. Status at evaluation/last progress note:Pt fearful of stairs in clinic, at [facility].      3.   Goal:Pt will perform sit to stand independently with <50% correction for safety. Status at evaluation/last progress note:Pt requires 100% cueing for safe sitting during transfers. 4.   Goal:Patient will be able to ambulate at least 50 ft with no more than supervision and without LOB in order to navigate home with increased safety. Status at evaluation/last progress note: Pt ambulated with partial HHA (25%) with PT for 50 ft during Eval.                  Frequency / Duration: Patient to be seen 2-3 times per week for 4 weeks.     Patient/  Caregiver education and instruction: Diagnosis, prognosis, self care, activity modification, and exercises   [x]  Plan of care has been reviewed with MEILIO Arias, PT 11/1/2022 2:32 PM    ________________________________________________________________________    I certify that the above Therapy Services are being furnished while the patient is under my care. I agree with the treatment plan and certify that this therapy is necessary.     Physician's Signature:____________Date:_________TIME:________     Romie Huynh MD  ** Signature, Date and Time must be completed for valid certification **    Please sign and return to [facility] COMPANY OF [facility]  [address] [facility]  [phone] fax

## 2022-11-01 NOTE — PROGRESS NOTES
ST DAILY TREATMENT NOTE    Patient Name: Yarely Santos  Date:2022  : 1970  [x]  Patient  Verified  Payor: [employer] MEDICARE / Plan: [address] / Product Type: Managed Care Medicare /   In time:220  Out time:300  Total Treatment Time (min): 40  Visit #: 1 of 8    SUBJECTIVE  Pain Level (0-10 scale): not assessed d/t clinician error  Any medication changes, allergies to medications, adverse drug reactions, diagnosis change, or new procedure performed?: [] No    [] Yes (see summary sheet for update)  Subjective functional status/changes:   [] No changes reported  The patient is a 44 y/o male who presents for a speech and language evaluation. Patient has Down Syndrome and is minimally verbal. His mother provided all information. Patient had CVA in 2021. His mother reports that afterwards he was able to talk, although they had some difficulty understanding him. However, she reports that now he rare talks at all and communicates primarily through gesture. Patient has also reportedly been holding his throat and coughing during solid food intake. Verbal permission has been given by patient's mother to contact ENT and view documentation.      Date of Onset: 2021  Social History: Lives with parents and sister  Prior Functional level: decreased expressive/receptive language  Radiology: MBS ordered by ENT  OBJECTIVE    OUTPATIENT SPEECH-LANGUAGE EVALUATION    Receptive Language:  Receptive vocabulary    [] WNL    [] Impaired [] Mild [] Mod [] Severe  Reliability of yes/no responses to questions [] WNL    [] Impaired [] Mild [] Mod [] Severe Reliability of responses to complex ideation [] WNL    [] Impaired [] Mild [] Mod [] Severe  Auditory retention/processing   [] WNL    [] Impaired [] Mild [] Mod [] Severe  Follow commands [] 1 Step [] 2 Step [] 3 Step [] complex  Objective Information:    Expressive Language  Automatic speech   [] WNL    [] Impaired [] Mild [] Mod [] Severe  Able to identify objects/pictures  [] WNL    [] Impaired [] Mild [] Mod [] Severe  Preservations    [] WNL    [] Impaired [] Mild [] Mod [] Severe  Word retrieval    [] WNL    [] Impaired [] Mild [] Mod [] Severe  Paraphasias   []None[] Literal    [] Phenomic   [] Jargon   [] Semantic  Able to make needs known at level [] Word   [] Phrase   [] Sentence   [] Gesture        [] Unable   Objective Information:    Writing: [] L or [] R [] WNL    [] Impaired @ [] Word [] Sentence [] Paragraph    Reading:  [] WNL    [] Impaired @ [] Word [] Sentence [] Paragraph    Cognition:  Attention: [] Alert    [] Drowsy  Orientation: [] Person   [] Place    [] Time  Memory: [] WNL    [] Impaired ST   [] Impaired LT  Comments:    Executive Functions:  Problem Solving: [] WNL     [] Impaired [] Mild [] Mod [] Severe  Neglect:  [] None    [] Left   [] Right  Self-regulation  [] Appropriate   [] Impulsive   [] Requires verbal cues  Awareness:  [] Poor initiation   [] Disinhibition   [] Constant supervision        Speech:  Oral Verbal Apraxia: [] None    [] Mild    [] Moderate    [] Severe   Dysarthria:  [] None    [] Mild    [] Moderate    [] Severe   Intelligibility:  [] WNL    [] Words   [] Phrases   [] Sentences   [] Conversation      % Intelligible:  Voice:   [] WNL    [] Hoarse   [] Breathy   Comments:  Fluency:  [] WNL    [] Dysfluent:    Patient has seen ENT and an MBS has been ordered. Oral mechanism exam was performed; patient had some difficulty following instructions. Lips and tongue are symmetrical. With heavy cues and models, he was able to perform SMRs, which were noted to be slow and not always correct. Voice was soft and breathy. Patient able to repeat single words and often produced single word stereotypies. He was unable to label pictures.     Patient/Caregiver instruction/education: [] Review HEP    [] Progressed/Changed    HEP/Handouts given: swallow safety, soft foods    Pain Level (0-10 scale) post treatment: not assessed d/t clinician error    ASSESSMENT  [x]  See Plan of Care      PLAN  []  Upgrade activities as tolerated     []  Continue plan of care  []  Discharge due to:__  [] Other:__    ARMOND Rice 11/1/2022  3:10 PM

## 2022-11-01 NOTE — PROGRESS NOTES
PT DAILY TREATMENT NOTE - [facility]     Patient Name: Candy Hoyt  Date:2022  : 1970  [x]  Patient  Verified  Payor: [employer] HE[employer] MEDICARE / Plan: [address] / Product Type: Managed Care Medicare /    In time:305  Out time:344  Total Treatment Time (min): 39  Visit #: 1 of 12    Treatment Area: Hemiplegia, unspecified affecting unspecified side [G81.90]    SUBJECTIVE  Pain Level (0-10 scale): 0/10  Any medication changes, allergies to medications, adverse drug reactions, diagnosis change, or new procedure performed?: [x] No    [] Yes (see summary sheet for update)  Subjective functional status/changes:   [] No changes reported  See eval    OBJECTIVE  15 min []Eval                  []Re-Eval       25 min Therapeutic Exercise:  [] See flow sheet :   Rationale: increase ROM and increase strength to improve the patients ability to ease with adl's          With   [] TE   [] TA   [] neuro   [] other: Patient Education: [x] Review HEP    [] Progressed/Changed HEP based on:   [] positioning   [] body mechanics   [] transfers   [] heat/ice application    [] other:      Other Objective/Functional Measures: see eval     Pain Level (0-10 scale) post treatment: 0/10    ASSESSMENT/Changes in Function: see poc    Patient will continue to benefit from skilled PT services to modify and progress therapeutic interventions, address functional mobility deficits, address ROM deficits, address strength deficits, analyze and address soft tissue restrictions, analyze and cue movement patterns, analyze and modify body mechanics/ergonomics, assess and modify postural abnormalities, address imbalance/dizziness, and instruct in home and community integration to attain remaining goals.        xSee Plan of Care  []  See progress note/recertification  []  See Discharge Summary         P[]rogress towards goals / Updated goals:  See poc    PLAN  [x]  Upgrade activities as tolerated     [x] Continue plan of care  []  Update interventions per flow sheet       []  Discharge due to:_  []  Other:_      Honey Wright, PT 11/1/2022  2:31 PM    Future Appointments   Date Time Provider Janis Conteh   11/1/2022  3:00 PM Karis Miles, PT [facility]   11/3/2022  4:30 PM [facility] 1 [facility]

## 2022-11-07 ENCOUNTER — HOSPITAL ENCOUNTER (OUTPATIENT)
Dept: PHYSICAL THERAPY | Age: 52
Discharge: HOME | End: 2022-11-07
Payer: MEDICARE

## 2022-11-07 PROCEDURE — 97112 NEUROMUSCULAR REEDUCATION: CPT

## 2022-11-07 PROCEDURE — 92507 TX SP LANG VOICE COMM INDIV: CPT

## 2022-11-07 PROCEDURE — 97530 THERAPEUTIC ACTIVITIES: CPT

## 2022-11-07 NOTE — PROGRESS NOTES
ST DAILY TREATMENT NOTE    Patient Name: Miracle Castillo  Date:2022  : 1970  [x]  Patient  Verified  Payor: Payor: [facility] MEDICARE / Plan: [address] / Product Type: Managed Care Medicare /   In time:240  Out time:310  Total Treatment Time (min): 30  Visit #: 2 of 8    Treatment Diagnosis: Dysarthria [R47.1]    SUBJECTIVE  Pain Level (0-10 scale): unable to assess d/t expressive and receptive language deficits  Any medication changes, allergies to medications, adverse drug reactions, diagnosis change, or new procedure performed?: [] No    [] Yes (see summary sheet for update)    Subjective functional status/changes:   [] No changes reported  Per ENT visit note, patient with erythema and edema of the arytenoids and interarytenoids. OBJECTIVE  Treatment provided includes:  Increase/Improve:  []  Voice Quality []  Cognitive Linguistic Skills []  Laryngeal/Pharyngeal Exercises   []  Vocal Loudness []  Reading Comprehension []  Swallowing Skills    []  Vocal Cord Function []  Auditory Comprehension []  Oral Motor Skills   []  Resonance []  Writing Skills []  Compensatory strategies    []  Speech Intelligibility []  Expressive Language []  Attention   []  Breath Support/Coord.  []  Receptive language []  Memory   []  Articulation []  Safety Awareness []    []  Fluency []  Word Retrieval []        Treatment Provided:  -patient education    Patient/Caregiver  Education: [x] Review HEP      HEP/Handouts given: basic needs communication board    Pain Level (0-10 scale) post treatment: Not assessed    ASSESSMENT     []   Improving appropriately and progressing toward goals  []   Improving slowly and progressing toward goals  []   Approximating goals/maximum potential  []   Continues to benefit from skilled therapy to address remaining functional deficits  []   Not progressing toward goals and plan of care will be adjusted    Patient will continue to benefit from skilled therapy to address remaining functional deficits: voice, expressive/receptivelanguage    Progress towards goals / Updated goals: 1. The patient will complete oral motor exercises for increased speech intelligibility with 60% accuracy given mod-max visual and verbal cues. 2) Pt will complete graded expressive and receptive labeling tasks with 60% accuracy given modA in order  to promote verbalizations and word retrieval.      3) Patient will ID letters/numbers/basic CVC words with 70% accuracy to increase functional reading and communication. 4) Patient will follow 1-2 step directions targeting body part identification and the environment x80% accuracy to increase functional receptive language skills. 5. The patient will trial a variety of alternate and augmentative communication modes and devices in order to increase ability to express wants and needs. 6. Patient will undergo MBS in order to objectively assess oropharyngeal function. 11/7/22Judee Mote patient education on GERD and voice/swallowing, including adherence to PPI and diet with food list providde. Patient's mother demo'd understanding and teach back.  MBS scheduled for 11/22/22    PLAN  [x]  Continue plan of care  []  Modify Goals/Treatment Plan      []  Discharge due to:  [] Other:    ARMOND Talley 11/7/2022  3:04 PM    Future Appointments   Date Time Provider Janis Conteh   11/10/2022  2:15 PM Mani Desai PTA [facility]   11/10/2022  3:00 PM ARMOND Douglass [facility]   11/14/2022  3:00 PM ARMOND Douglass [facility]   11/14/2022  3:45 PM Mani Desai PTA [facility]   11/17/2022  3:00 PM Skylar Vega, [address] [place] [address], SLP [facility]   11/17/2022  3:45 PM Farrah Hammer, PT [facility]   11/21/2022  3:45 PM Mani Desai PTA [facility]   11/22/2022 11:00  [facility] XR 4 [facility]   11/28/2022  2:15 PM Santa Moreland, PT [facility]   11/28/2022  3:00 PM Skylar Vega [address] [place] [address], SLP [facility]   12/1/2022  1:30 PM Alem Pruitt, SLP [facility]   12/1/2022  2:15 PM Stafford Kayser, PTA [facility]

## 2022-11-07 NOTE — PROGRESS NOTES
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 His mom states she has noticed increased difficulty on stairs and increased difficulty with big steps. Per the neurologist there was no new stroke but the pt does have decreased circulation to the brain. They have not had a follow up yet to decide next steps or learn more at this time.      OBJECTIVE    14 min Therapeutic Activity:  [x]  See flow sheet : sit to stands; family education on bright color tape on the stairs at home for visual cue to assist with depth perception and foot placement for clearance on the steps; family ed on increased left side scanning and utilizing left side more at home   Rationale: increase ROM, increase strength, improve coordination, improve balance, and increase proprioception  to improve the patients ability to be more independent at home     30 min Neuromuscular Re-education:  [x]  See flow sheet :  see below   Rationale: increase ROM, increase strength, improve coordination, improve balance, and increase proprioception  to improve the patients ability to decrease fall risk With   [] TE   [] TA   [] neuro   [] other: Patient Education: [x] Review HEP    [] Progressed/Changed HEP based on:   [] positioning   [] body mechanics   [] transfers   [] heat/ice application    [] other:      Other Objective/Functional Measures:   Stomping to bright pink sticky notes to encourage larger step lengths with visual cues  Cone finding with emphasis on left side awareness  Ball toss  High fives with contralateral reaching to encourage scanning  Ball kicks to encourage left LE use  Education provided to mom as above noted     Pain Level (0-10 scale) post treatment: 0/10    ASSESSMENT/Changes in Function: Pt ambulates with decreased step length and decreased left side awareness. He demonstrates decreased use of left UE and LE for functional tasks like ball kicks and ball tosses. He has increased fear apprehension with stairs per mother but suspect slight neglect and visual deficits to be contributing as he responds better to bright colors for visual cues such as with stomping. Will continue to progress neuromuscular re-education and left scanning to improve safety at home and ease of functional tasks. Patient will continue to benefit from skilled PT services to modify and progress therapeutic interventions, address functional mobility deficits, address ROM deficits, address strength deficits, analyze and address soft tissue restrictions, analyze and cue movement patterns, analyze and modify body mechanics/ergonomics, assess and modify postural abnormalities, address imbalance/dizziness, and instruct in home and community integration to attain remaining goals. [x]  See Plan of Care  [x]  See progress note/recertification  []  See Discharge Summary         Progress towards goals / Updated goals:  Short Term Goals:  To be accomplished in 1 weeks:               Goal: Pt will perform static visual tracking left > right activities to establish baseline for safe ambulation/ balance and activity tolerance. Status at evaluation/last progress note: Initiated left turns with waking. Long Term Goals: To be accomplished in 4 weeks:  Goal: Pt will increase FOTO  score by  7 pts to improve function. Status at evaluation/last progress note:FOTO= NA        2. Goal: Patient will be able to ascend/descend at least 4 steps x 1 with SBA/CGA in order to negotiate stairs at his home. Status at evaluation/last progress note:Pt fearful of stairs in clinic, at [facility].      3.   Goal:Pt will perform sit to stand independently with <50% correction for safety. Status at evaluation/last progress note:Pt requires 100% cueing for safe sitting during transfers. 4.   Goal:Patient will be able to ambulate at least 50 ft with no more than supervision and without LOB in order to navigate home with increased safety.   Status at evaluation/last progress note: Pt ambulated with partial HHA (25%) with PT for 50 ft during Eval.                 PLAN  [x]  Upgrade activities as tolerated     [x]  Continue plan of care  []  Update interventions per flow sheet       []  Discharge due to:_  []  Other:_      Lata Lara, EMILIO 11/7/2022  8:12 AM    Future Appointments   Date Time Provider Janis Conteh   11/7/2022  1:30 PM Texie Slipper, PTA [facility]   11/7/2022  3:00 PM Walker Lloyd, SLP [facility]   11/10/2022  2:15 PM Texie Slipper, PTA [facility]   11/10/2022  3:00 PM Walker Flatter, SLP [facility]   11/14/2022  3:00 PM Walker Lloyd, SLP [facility]   11/14/2022  3:45 PM Texie Slipper, PTA [facility]   11/17/2022  3:00 PM Mell Weber, SLP [facility]   11/17/2022  3:45 PM Farrah Lima, PT [facility]   11/21/2022  3:45 PM Tee Jean, PTA [facility]   11/22/2022 11:00  [facility] XR 4 [facility]   11/28/2022  2:15 PM Jaswinder Heart, PT [facility]   11/28/2022  3:00 PM Walker Lloyd, SLP [facility]   12/1/2022  1:30 PM Mell Weber, SLP [facility]   12/1/2022  2:15 PM Yumiko Orellana, Deann Aguila, PTA [facility]

## 2022-11-10 ENCOUNTER — HOSPITAL ENCOUNTER (OUTPATIENT)
Dept: PHYSICAL THERAPY | Age: 52
Discharge: HOME | End: 2022-11-10
Payer: MEDICARE

## 2022-11-10 PROCEDURE — 97530 THERAPEUTIC ACTIVITIES: CPT

## 2022-11-10 PROCEDURE — 92507 TX SP LANG VOICE COMM INDIV: CPT

## 2022-11-10 PROCEDURE — 97112 NEUROMUSCULAR REEDUCATION: CPT

## 2022-11-10 NOTE — PROGRESS NOTES
ST DAILY TREATMENT NOTE    Patient Name: Adonis Blake  Date:11/10/2022  : 1970  [x]  Patient  Verified  Payor: Payor: [employer] MEDICARE / Plan: [address] / Product Type: Managed Care Medicare /   In time: 300  Out time:340  Total Treatment Time (min): 40  Visit #: 3 of 8    Treatment Diagnosis: Dysarthria [R47.1]    SUBJECTIVE  Pain Level (0-10 scale): 0  Any medication changes, allergies to medications, adverse drug reactions, diagnosis change, or new procedure performed?: [] No    [] Yes (see summary sheet for update)    Subjective functional status/changes:   [] No changes reported  Patient's mother reported that patient had not been clutching throat lately, suggesting pain is lessened. OBJECTIVE  Treatment provided includes:  Increase/Improve:  []  Voice Quality []  Cognitive Linguistic Skills []  Laryngeal/Pharyngeal Exercises   []  Vocal Loudness []  Reading Comprehension []  Swallowing Skills    []  Vocal Cord Function []  Auditory Comprehension []  Oral Motor Skills   []  Resonance []  Writing Skills []  Compensatory strategies    []  Speech Intelligibility []  Expressive Language []  Attention   []  Breath Support/Coord.  []  Receptive language []  Memory   []  Articulation []  Safety Awareness []    []  Fluency []  Word Retrieval []        Treatment Provided:  -patient education  -labeling    Patient/Caregiver  Education: [x] Review HEP      HEP/Handouts given: aphasia handout    Pain Level (0-10 scale) post treatment: Not assessed    ASSESSMENT     []   Improving appropriately and progressing toward goals  [x]   Improving slowly and progressing toward goals  []   Approximating goals/maximum potential  [x]   Continues to benefit from skilled therapy to address remaining functional deficits  []   Not progressing toward goals and plan of care will be adjusted    Patient will continue to benefit from skilled therapy to address remaining functional deficits: voice, expressive/receptivelanguage    Progress towards goals / Updated goals: 1. The patient will complete oral motor exercises for increased speech intelligibility with 60% accuracy given mod-max visual and verbal cues. 2) Pt will complete graded expressive and receptive labeling tasks with 60% accuracy given modA in order  to promote verbalizations and word retrieval.    11/10/22: patient imitiated model to label colors and animals with max prompts and visual cues ~90% acc   3) Patient will ID letters/numbers/basic CVC words with 70% accuracy to increase functional reading and communication. 4) Patient will follow 1-2 step directions targeting body part identification and the environment x80% accuracy to increase functional receptive language skills. 5. The patient will trial a variety of alternate and augmentative communication modes and devices in order to increase ability to express wants and needs. 6. Patient will undergo MBS in order to objectively assess oropharyngeal function.     PLAN  [x]  Continue plan of care  []  Modify Goals/Treatment Plan      []  Discharge due to:  [] Other:    ARMOND Leonard 11/10/2022  3:04 PM    Future Appointments   Date Time Provider Janis Conteh   11/14/2022  3:00 PM ARMOND Jenkins [facility]   11/14/2022  3:45 PM Angelika Hernandez PTA [facility]   11/17/2022  3:00 PM Sarita Guy, [address] [place] [address], SLP [facility]   11/17/2022  3:45 PM Farrah Lowe, PT [facility]   11/21/2022  3:45 PM Angelika Hernandez PTA [facility]   11/22/2022 11:00  [facility] XR 4 [facility] [address] [facility]   11/28/2022  2:15 PM Miquel Travis, LESLY [facility]   11/28/2022  3:00 PM Laura Woods SLP [facility]   12/1/2022  1:30 PM Laura Woods, SLP [facility]   12/1/2022  2:15 PM Angelika Hernandez PTA [facility]

## 2022-11-10 NOTE — PROGRESS NOTES
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 Pt's mom states he has had multiple eye surgeries in the past and goes to see the eye doctor soon.  She hasn't found out more besides the decreased circulation to the brain per the neurologist.     OBJECTIVE    15 min Therapeutic Activity:  [x]  See flow sheet : step ups; stair attempt; step overs; sit to stands   Rationale: increase ROM, increase strength, improve coordination, improve balance, and increase proprioception  to improve the patients ability to be more independent at home     29 min Neuromuscular Re-education:  [x]  See flow sheet :  see below   Rationale: increase ROM, increase strength, improve coordination, improve balance, and increase proprioception  to improve the patients ability to decrease fall risk           With   [] TE   [] TA   [] neuro   [] other: Patient Education: [x] Review HEP    [] Progressed/Changed HEP based on:   [] positioning   [] body mechanics   [] transfers   [] heat/ice application    [] other:      Other Objective/Functional Measures:   Left neglect with decreased use of left UE and turns left LE into ER at times  Fear apprehension with stairs with increased breathing so held negotiating stairs at this time  Worked on step ups in the // with colorful sticky note to help with stair height  Pt appears to have some visual deficits but is unable to verbally express them  Challenged with taking larger steps  Increased cues for scanning for cone finds especially to the left  Difficulty with foot clearance to step over obstacles and fully clear the obstacles to avoid stepping on    Pain Level (0-10 scale) post treatment: 0/10    ASSESSMENT/Changes in Function: Pt making slow progress to advance functional ability at home. He demonstrates left neglect and some form of visual deficit but is unable to verbally express. He ambulates with decreased step length and decreased heel strike. He has decreased use of left UE/LE and tends to hold his left LE in ER. Patient will continue to benefit from skilled PT services to modify and progress therapeutic interventions, address functional mobility deficits, address ROM deficits, address strength deficits, analyze and address soft tissue restrictions, analyze and cue movement patterns, analyze and modify body mechanics/ergonomics, assess and modify postural abnormalities, address imbalance/dizziness, and instruct in home and community integration to attain remaining goals. [x]  See Plan of Care  [x]  See progress note/recertification  []  See Discharge Summary         Progress towards goals / Updated goals:  Short Term Goals: To be accomplished in 1 weeks:               Goal: Pt will perform static visual tracking left > right activities to establish baseline for safe ambulation/ balance and activity tolerance. Status at evaluation/last progress note: Initiated left turns with waking. Long Term Goals: To be accomplished in 4 weeks:  Goal: Pt will increase FOTO  score by  7 pts to improve function. Status at evaluation/last progress note:FOTO= NA        2.    Goal: Patient will be able to ascend/descend at least 4 steps x 1 with SBA/CGA in order to negotiate stairs at his home. Status at evaluation/last progress note:Pt fearful of stairs in clinic, at [facility].      3.   Goal:Pt will perform sit to stand independently with <50% correction for safety. Status at evaluation/last progress note:Pt requires 100% cueing for safe sitting during transfers. 4.   Goal:Patient will be able to ambulate at least 50 ft with no more than supervision and without LOB in order to navigate home with increased safety.   Status at evaluation/last progress note: Pt ambulated with partial HHA (25%) with PT for 50 ft during Eval.                 PLAN  [x]  Upgrade activities as tolerated     [x]  Continue plan of care  []  Update interventions per flow sheet       []  Discharge due to:_  []  Other:_      Radha Gardner, EMILIO 11/10/2022  8:12 AM    Future Appointments   Date Time Provider Janis Conteh   11/10/2022  2:15 PM Mani Desai, PTA [facility]   11/10/2022  3:00 PM Wilbert Holliday, SLP [facility]   11/14/2022  3:00 PM Wilbert Holliday, SLP [facility]   11/14/2022  3:45 PM Mani Desai, PTA [facility]   11/17/2022  3:00 PM Jackie Arias, SLP [facility]   11/17/2022  3:45 PM Farrah Hammer, PT [facility]   11/21/2022  3:45 PM Mani Desai, PTA [facility]   11/22/2022 11:00 AM [facility] XR 4 [facility]   11/28/2022  2:15 PM Santa Moreland, PT [facility]   11/28/2022  3:00 PM ARMOND Douglass [facility]   12/1/2022  1:30 PM ARMOND Douglass [facility]   12/1/2022  2:15 PM Mani Desai PTA [facility]

## 2022-11-14 ENCOUNTER — HOSPITAL ENCOUNTER (OUTPATIENT)
Dept: PHYSICAL THERAPY | Age: 52
End: 2022-11-14
Payer: MEDICARE

## 2022-11-14 ENCOUNTER — APPOINTMENT (OUTPATIENT)
Dept: PHYSICAL THERAPY | Age: 52
End: 2022-11-14
Payer: MEDICARE

## 2022-11-17 ENCOUNTER — HOSPITAL ENCOUNTER (OUTPATIENT)
Dept: PHYSICAL THERAPY | Age: 52
Discharge: HOME | End: 2022-11-17
Payer: MEDICARE

## 2022-11-17 ENCOUNTER — HOSPITAL ENCOUNTER (OUTPATIENT)
Dept: PHYSICAL THERAPY | Age: 52
End: 2022-11-17
Payer: MEDICARE

## 2022-11-17 PROCEDURE — 92507 TX SP LANG VOICE COMM INDIV: CPT

## 2022-11-17 NOTE — PROGRESS NOTES
ST DAILY TREATMENT NOTE    Patient Name: Jb Begin  Date:2022  : 1970  [x]  Patient  Verified  Payor: Payor: [employer] MEDICARE / Plan: [address] / Product Type: Managed Care Medicare /   In time: 300  Out time:345  Total Treatment Time (min): 45  Visit #: 4 of 8    Treatment Diagnosis: Dysarthria [R47.1]    SUBJECTIVE  Pain Level (0-10 scale): 0  Any medication changes, allergies to medications, adverse drug reactions, diagnosis change, or new procedure performed?: [] No    [] Yes (see summary sheet for update)    Subjective functional status/changes:   [] No changes reported  Patient's mothing reports that he is making more communicative attempts. OBJECTIVE  Treatment provided includes:  Increase/Improve:  []  Voice Quality []  Cognitive Linguistic Skills []  Laryngeal/Pharyngeal Exercises   []  Vocal Loudness []  Reading Comprehension []  Swallowing Skills    []  Vocal Cord Function []  Auditory Comprehension []  Oral Motor Skills   []  Resonance []  Writing Skills []  Compensatory strategies    []  Speech Intelligibility []  Expressive Language []  Attention   []  Breath Support/Coord.  []  Receptive language []  Memory   []  Articulation []  Safety Awareness []    []  Fluency []  Word Retrieval []        Treatment Provided:  -receptive/expressive language    Patient/Caregiver  Education: [x] Review HEP      HEP/Handouts given: aphasia handout    Pain Level (0-10 scale) post treatment: Not assessed    ASSESSMENT     []   Improving appropriately and progressing toward goals  [x]   Improving slowly and progressing toward goals  []   Approximating goals/maximum potential  [x]   Continues to benefit from skilled therapy to address remaining functional deficits  []   Not progressing toward goals and plan of care will be adjusted    Patient will continue to benefit from skilled therapy to address remaining functional deficits: voice, expressive/receptivelanguage    Progress towards goals / Updated goals: 1. The patient will complete oral motor exercises for increased speech intelligibility with 60% accuracy given mod-max visual and verbal cues. 2) Pt will complete graded expressive and receptive labeling tasks with 60% accuracy given modA in order  to promote verbalizations and word retrieval.    11/17/22: patient imitmated models of single words with visual and tactile cues ~30% acc of opportunities. 3) Patient will ID letters/numbers/basic CVC words with 70% accuracy to increase functional reading and communication. 4) Patient will follow 1-2 step directions targeting body part identification and the environment x80% accuracy to increase functional receptive language skills. 5. The patient will trial a variety of alternate and augmentative communication modes and devices in order to increase ability to express wants and needs. 6. Patient will undergo MBS in order to objectively assess oropharyngeal function.     PLAN  [x]  Continue plan of care  []  Modify Goals/Treatment Plan      []  Discharge due to:  [] Other:    ARMOND Locke 11/17/2022  3:04 PM    Future Appointments   Date Time Provider Janis Conteh   11/21/2022  3:45 PM Elena Miller PTA [facility]   11/22/2022 11:00 AM [facility] XR 4 [facility]   11/28/2022  2:15 PM Brayden Maher, LESLY [facility]   11/28/2022  3:00 PM [facility] Landin [facility]   12/1/2022  1:30 PM [facility]D Landin [facility]   12/1/2022  2:15 PM Elena Miller PTA [facility]

## 2022-11-21 ENCOUNTER — HOSPITAL ENCOUNTER (OUTPATIENT)
Dept: PHYSICAL THERAPY | Age: 52
Discharge: HOME | End: 2022-11-21
Payer: MEDICARE

## 2022-11-21 PROCEDURE — 97530 THERAPEUTIC ACTIVITIES: CPT

## 2022-11-21 PROCEDURE — 97112 NEUROMUSCULAR REEDUCATION: CPT

## 2022-11-21 NOTE — PROGRESS NOTES
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 She says she okay with DC next visit. She reports stairs are getting better with her son.      OBJECTIVE    30 min Therapeutic Activity:  [x]  See flow sheet : see below   Rationale: increase ROM, increase strength, improve coordination, improve balance, and increase proprioception  to improve the patients ability to be more independent at home     15 min Neuromuscular Re-education:  [x]  See flow sheet :  see below   Rationale: increase ROM, increase strength, improve coordination, improve balance, and increase proprioception  to improve the patients ability to decrease fall risk           With   [] TE   [] TA   [] neuro   [] other: Patient Education: [x] Review HEP    [] Progressed/Changed HEP based on:   [] positioning   [] body mechanics   [] transfers   [] heat/ice application    [] other:      Other Objective/Functional Measures:   FOTO (22): 45  Worked on pt sit-to-stands with big movement  Visual scanning with cones/ cues for scanning and reaching farther to get the cone  Worked on sitting balance with reaching/ verbal and tactile cues needed for visual scanning at different heights  Picking up laundry from the floor and putting in a basket/ CGA for balance    Worked on reaching for cones from different level shelves to/ verbal cues needed to get closer to the counter top  1# thera bar for B use of UEs with hitting a beach ball assistance on left UE    Pain Level (0-10 scale) post treatment: 0/10    ASSESSMENT/Changes in Function:  Per subjective information, pt with great improvement with stair negotiation at home but still continues to demonstrate left neglect. Pt's  mom independent with activities to work on at home and is planning to discharge next visit. Patient will continue to benefit from skilled PT services to modify and progress therapeutic interventions, address functional mobility deficits, address ROM deficits, address strength deficits, analyze and address soft tissue restrictions, analyze and cue movement patterns, analyze and modify body mechanics/ergonomics, assess and modify postural abnormalities, address imbalance/dizziness, and instruct in home and community integration to attain remaining goals. [x]  See Plan of Care  [x]  See progress note/recertification  []  See Discharge Summary         Progress towards goals / Updated goals:  Short Term Goals: To be accomplished in 1 weeks:               Goal: Pt will perform static visual tracking left > right activities to establish baseline for safe ambulation/ balance and activity tolerance. Status at evaluation/last progress note: Initiated left turns with waking. Long Term Goals: To be accomplished in 4 weeks:  Goal: Pt will increase FOTO  score by  7 pts to improve function. Status at evaluation/last progress note:FOTO= NA  (11/21/22): 45        2. Goal: Patient will be able to ascend/descend at least 4 steps x 1 with SBA/CGA in order to negotiate stairs at his home.   Status at evaluation/last progress note:Pt fearful of stairs in clinic, at [facility].   MET per mom at home (11/21/22)     3. Goal:Pt will perform sit to stand independently with <50% correction for safety. Status at evaluation/last progress note:Pt requires 100% cueing for safe sitting during transfers. 4.   Goal:Patient will be able to ambulate at least 50 ft with no more than supervision and without LOB in order to navigate home with increased safety. Status at evaluation/last progress note: Pt ambulated with partial HHA (25%) with PT for 50 ft during Eval.                 PLAN  [x]  Upgrade activities as tolerated     [x]  Continue plan of care  []  Update interventions per flow sheet       []  Discharge due to:_  [x]  Other: DC next visit     JONAH Abreu 11/21/2022  8:12 AM    I was present during the entire treatment, directing and participating in the treatment.    JER Dale     Future Appointments   Date Time Provider Janis Conteh   11/21/2022  3:45 PM [facility]   11/28/2022  2:15 PM Miquel Travis, PT [facility]   11/28/2022  3:00 PM Laura Chuck, SLP [facility]   12/1/2022  1:30 PM Lauraanastasiya Woods, SLP [facility]   12/1/2022  2:15 PM Angelika Hernandez, PTA [facility]   12/15/2022 10:00 AM [facility] XR 4 [facility]

## 2022-11-28 ENCOUNTER — HOSPITAL ENCOUNTER (OUTPATIENT)
Dept: PHYSICAL THERAPY | Age: 52
Discharge: HOME | End: 2022-11-28
Payer: MEDICARE

## 2022-11-28 PROCEDURE — 97112 NEUROMUSCULAR REEDUCATION: CPT

## 2022-11-28 PROCEDURE — 92507 TX SP LANG VOICE COMM INDIV: CPT

## 2022-11-28 PROCEDURE — 97110 THERAPEUTIC EXERCISES: CPT

## 2022-11-28 NOTE — THERAPY DISCHARGE
[facility] [place] [employer]  [address] [facility]  [phone] fax    Speech Therapy Daily Note Discharge Summary  Date:2022  Patient name: Kizzy Velazquez Start of Care: 22   Referral source: Daniel Hernadez MD : 1970   Medical/Treatment Diagnosis: Dysarthria [R47.1]  Payor: [facility],Pepe[place] / Plan: [place] [address] / Product Type: Managed Care Medicare /  Onset Date:     Prior Hospitalization: see medical history Provider#: [id number]   Medications: Verified on Patient Summary List    Comorbidities: Down Syndrome, GERD, CVA  Prior Level of Function:decreased receptive/expressive language d/t Down syndrome. Per mother's report, prior to CVA patient communicated primarily in single words or short sentences. Visits from Start of Care: 5    Missed Visits: 1    Reporting Period : 22 to 22    [x]  Patient  Verified  Payor: [facility],9D / Plan: [place] [employer] / Product Type: Managed Care Medicare /   In time: 300  Out time:335  Total Treatment Time (min): 35  Visit #: 5 of 8    SUBJECTIVE  Pain Level (0-10 scale): not assessed d/t receptive/expressive language deficits  Any medication changes, allergies to medications, adverse drug reactions, diagnosis change, or new procedure performed?: [] No    [] Yes (see summary sheet for update)    Subjective functional status/changes:   [] No changes reported  Patient's mother believes patient's throat is feeling better because he no longer clutches at it.     OBJECTIVE  Treatment provided includes:  Increase/Improve:  []  Voice Quality []  Cognitive Linguistic Skills []  Laryngeal/Pharyngeal Exercises   []  Vocal Loudness []  Reading Comprehension []  Swallowing Skills    []  Vocal Cord Function []  Auditory Comprehension []  Oral Motor Skills   []  Resonance []  Writing Skills []  Compensatory strategies    [] Speech Intelligibility [x]  Expressive Language []  Attention   []  Breath Support/Coord. []  Receptive language []  Memory   []  Articulation []  Safety Awareness []    []  Fluency []  Word Retrieval []        Treatment Provided:  Patient education    Patient/Caregiver  Education: [] Review HEP      HEP/Handouts given: NA    Pain Level (0-10 scale) post treatment: not assessed    Summary of Care:  Goal:.The patient will complete oral motor exercises for increased speech intelligibility with 60% accuracy given mod-max visual and verbal cues. Status at last note/certification: New goal  Status at discharge: not met; goal discontinued d/t receptive language deficits    Goal:Pt will complete graded expressive and receptive labeling tasks with 60% accuracy given modA in order  to promote verbalizations and word retrieval.   Status at last note/certification: New goal  Status at discharge: not met; given heavy models and cueing, patient producing single words to label in ~40% of opportunities    Goal:Patient will ID letters/numbers/basic CVC words with 70% accuracy to increase functional reading and communication. Status at last note/certification:New goal  Status at discharge: not met    Goal:The patient will trial a variety of alternate and augmentative communication modes and devices in order to increase ability to express wants and needs. Status at last note/certification:New goal  Status at discharge: met; patient and caregiver provided with communication board for basic wants and needs.       ASSESSMENT: The patient has reached maximum potential on established goals and skilled therapy is no longer medically necessary    RECOMMENDATIONS:  [x]Discontinue therapy: [x]Patient has reached or is progressing toward set goals      []Patient is non-compliant or has abdicated      [x]Due to lack of appreciable progress towards set [address], SLP 11/28/2022 3:38 PM

## 2022-11-28 NOTE — PROGRESS NOTES
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 Reports Mr Omar Bennett has been doing better     OBJECTIVE       30 min Therapeutic Activity:  [x]  See flow sheet : see below   Rationale: increase ROM, increase strength, improve coordination, improve balance, and increase proprioception  to improve the patients ability to be more independent at home     15 min Neuromuscular Re-education:  [x]  See flow sheet :  see below   Rationale: increase ROM, increase strength, improve coordination, improve balance, and increase proprioception  to improve the patients ability to decrease fall risk        With   [] TE   [] TA   [] neuro   [] other: Patient Education: [x] Review HEP    [] Progressed/Changed HEP based on:   [] positioning   [] body mechanics   [] transfers   [] heat/ice application    [] other:      Other Objective/Functional Measures: Performed Obstacle course that included 4 inch step up and cones. -sit to stand x 10 with 25% cueing.   - worked on cone pick ups and stacking and reaching across midline. Pain Level (0-10 scale) post treatment: 0/10    ASSESSMENT/Changes in Function: Pt progressed toward all goals .      Patient will continue to benefit from skilled PT services to modify and progress therapeutic interventions, address functional mobility deficits, address ROM deficits, address strength deficits, analyze and address soft tissue restrictions, analyze and cue movement patterns, analyze and modify body mechanics/ergonomics, assess and modify postural abnormalities, and address imbalance/dizziness to attain remaining goals. []  See Plan of Care  [x]  See progress note/recertification  []  See Discharge Summary         Progress towards goals / Updated goals:  Short Term Goals: To be accomplished in 1 weeks:               Goal: Pt will perform static visual tracking left > right activities to establish baseline for safe ambulation/ balance and activity tolerance. Status at evaluation/last progress note: Initiated left turns with waking. Long Term Goals: To be accomplished in 4 weeks:  Goal: Pt will increase FOTO  score by  7 pts to improve function. Status at evaluation/last progress note:FOTO= NA  (11/21/22): 45   FOTO:     2.   Goal: Patient will be able to ascend/descend at least 4 steps x 1 with SBA/CGA in order to negotiate stairs at his home. Status at evaluation/last progress note:Pt fearful of stairs in clinic, at [facility].   MET per mom at home (11/21/22)     3. Goal:Pt will perform sit to stand independently with <50% correction for safety. Status at evaluation/last progress note:Pt requires 100% cueing for safe sitting during transfers. 4.   Goal:Patient will be able to ambulate at least 50 ft with no more than supervision and without LOB in order to navigate home with increased safety. Status at evaluation/last progress note: Pt ambulated with partial HHA (25%) with PT for 50 ft during Eval.   Current: Amb >150ft with CGA.                  PLAN  [x]  Upgrade activities as tolerated     [x]  Continue plan of care  []  Update interventions per flow sheet       []  Discharge due to:_  []  Other:_      Jai Schmitz, PT 11/28/2022  2:08 PM    Future Appointments   Date Time Provider Janis Yady   11/28/2022  2:15 PM Delia Donahue, PT [facility]   11/28/2022  3:00 PM Lynda [address] [place] [address], SLP [facility]   12/15/2022 10:00 AM [facility] XR 4 [facility]

## 2022-11-29 NOTE — THERAPY DISCHARGE
[facility] - [facility] [employer] [facility] JADYN  [address] [place] [address]  [phone] fax    Physical Therapy Discharge Summary  Patient name: Maggy Alexander Start of Care: 2022   Referral source: Mark Wright MD : [id number]                Medical Diagnosis: Hemiplegia, unspecified affecting unspecified side [G81.90]  Payor: Lambert Judge / Plan: [address] / Product Type: [employer] /  Onset Date:2021                Treatment Diagnosis: Bilateral LE Weakness/Gait and mobility   Prior Hospitalization: see medical history Provider#: [id number]   Medications: Verified on Patient summary List    Comorbidities: Down syndrome, aortic valve replacement, corneal transplant, colon resection, right CVA 2021   Prior Level of Function: Lives in 1 story home with family (mother, father, and sister-caregiver)       Visits from [place] of Care: 6    Missed Visits: 0    Reporting Period :  to     Summary of Care:               Goal: Pt will perform static visual tracking left > right activities to establish baseline for safe ambulation/ balance and activity tolerance. Status at evaluation/last progress note: Initiated left turns with waking. Long Term Goals: To be accomplished in 4 weeks:  Goal: Pt will increase FOTO  score by  7 pts to improve function. Status at evaluation/last progress note:FOTO= NA  (22): 45   FOTO:     2.   Goal: Patient will be able to ascend/descend at least 4 steps x 1 with SBA/CGA in order to negotiate stairs at his home. Status at evaluation/last progress note:Pt fearful of stairs in clinic, at [facility].   MET per mom at home. Pt declined in Clinic. 3.   Goal:Pt will perform sit to stand independently with <50% correction for safety. Status at evaluation/last progress note:Pt requires 100% cueing for safe sitting during transfers.     Current: Met, however still requires VC's.    4.   Goal:Patient will be able to ambulate at least 50 ft with no more than supervision and without LOB in order to navigate home with increased safety. Status at evaluation/last progress note: Pt ambulated with partial HHA (25%) with PT for 50 ft during Eval.   Current: Amb >150ft with CGA. /MET                Per subjective information, pt with great improvement with stair negotiation at home but still continues to demonstrate left neglect. Pt's  mom independent with activities to work on at home and has agreed to DC from PT at this time.        ASSESSMENT/RECOMMENDATIONS:  [x]Discontinue therapy: [x]Patient has reached or is progressing toward set goals      []Patient is non-compliant or has abdicated      []Due to lack of appreciable progress towards set goals    Malka Powell, PT 11/29/2022 4:14 PM

## 2022-12-01 ENCOUNTER — APPOINTMENT (OUTPATIENT)
Dept: PHYSICAL THERAPY | Age: 52
End: 2022-12-01

## 2023-06-11 PROBLEM — A41.9 SEPSIS (HCC): Status: ACTIVE | Noted: 2023-06-11

## 2023-06-11 PROBLEM — K62.5 RECTAL BLEEDING: Status: ACTIVE | Noted: 2023-06-11

## 2023-06-11 PROBLEM — A41.9 SEPSIS (HCC): Status: RESOLVED | Noted: 2023-06-11 | Resolved: 2023-06-11

## 2023-11-24 PROBLEM — D68.9 COAGULOPATHY (HCC): Status: ACTIVE | Noted: 2023-11-24

## 2024-01-14 ENCOUNTER — HOSPITAL ENCOUNTER (EMERGENCY)
Facility: HOSPITAL | Age: 54
Discharge: STILL ADMITTED OR ADMINISTRATIVE | End: 2024-01-14
Attending: EMERGENCY MEDICINE
Payer: MEDICARE

## 2024-01-14 ENCOUNTER — APPOINTMENT (OUTPATIENT)
Facility: HOSPITAL | Age: 54
End: 2024-01-14
Payer: MEDICARE

## 2024-01-14 VITALS
WEIGHT: 125 LBS | BODY MASS INDEX: 22.15 KG/M2 | DIASTOLIC BLOOD PRESSURE: 81 MMHG | OXYGEN SATURATION: 95 % | HEART RATE: 94 BPM | SYSTOLIC BLOOD PRESSURE: 156 MMHG | HEIGHT: 63 IN | TEMPERATURE: 98.3 F | RESPIRATION RATE: 20 BRPM

## 2024-01-14 LAB
ALBUMIN SERPL-MCNC: 3.1 G/DL (ref 3.4–5)
ALBUMIN/GLOB SERPL: 0.8 (ref 0.8–1.7)
ALP SERPL-CCNC: 66 U/L (ref 45–117)
ALT SERPL-CCNC: 10 U/L (ref 16–61)
ANION GAP SERPL CALC-SCNC: 6 MMOL/L (ref 3–18)
AST SERPL-CCNC: 23 U/L (ref 10–38)
BASOPHILS # BLD: 0 K/UL (ref 0–0.1)
BASOPHILS NFR BLD: 0 % (ref 0–2)
BILIRUB SERPL-MCNC: 1 MG/DL (ref 0.2–1)
BUN SERPL-MCNC: 17 MG/DL (ref 7–18)
BUN/CREAT SERPL: 10 (ref 12–20)
CALCIUM SERPL-MCNC: 10 MG/DL (ref 8.5–10.1)
CHLORIDE SERPL-SCNC: 104 MMOL/L (ref 100–111)
CO2 SERPL-SCNC: 25 MMOL/L (ref 21–32)
CREAT SERPL-MCNC: 1.7 MG/DL (ref 0.6–1.3)
DIFFERENTIAL METHOD BLD: ABNORMAL
EOSINOPHIL # BLD: 0 K/UL (ref 0–0.4)
EOSINOPHIL NFR BLD: 0 % (ref 0–5)
ERYTHROCYTE [DISTWIDTH] IN BLOOD BY AUTOMATED COUNT: 12.2 % (ref 11.6–14.5)
GLOBULIN SER CALC-MCNC: 3.8 G/DL (ref 2–4)
GLUCOSE SERPL-MCNC: 150 MG/DL (ref 74–99)
HCT VFR BLD AUTO: 44.8 % (ref 36–48)
HGB BLD-MCNC: 15.7 G/DL (ref 13–16)
IMM GRANULOCYTES # BLD AUTO: 0 K/UL (ref 0–0.04)
IMM GRANULOCYTES NFR BLD AUTO: 0 % (ref 0–0.5)
LIPASE SERPL-CCNC: 15 U/L (ref 13–75)
LYMPHOCYTES # BLD: 0.8 K/UL (ref 0.9–3.6)
LYMPHOCYTES NFR BLD: 9 % (ref 21–52)
MCH RBC QN AUTO: 33.4 PG (ref 24–34)
MCHC RBC AUTO-ENTMCNC: 35 G/DL (ref 31–37)
MCV RBC AUTO: 95.3 FL (ref 78–100)
MONOCYTES # BLD: 0.6 K/UL (ref 0.05–1.2)
MONOCYTES NFR BLD: 7 % (ref 3–10)
NEUTS SEG # BLD: 7 K/UL (ref 1.8–8)
NEUTS SEG NFR BLD: 84 % (ref 40–73)
NRBC # BLD: 0 K/UL (ref 0–0.01)
NRBC BLD-RTO: 0 PER 100 WBC
PLATELET # BLD AUTO: 158 K/UL (ref 135–420)
PMV BLD AUTO: 10.1 FL (ref 9.2–11.8)
POTASSIUM SERPL-SCNC: 4.1 MMOL/L (ref 3.5–5.5)
PROT SERPL-MCNC: 6.9 G/DL (ref 6.4–8.2)
RBC # BLD AUTO: 4.7 M/UL (ref 4.35–5.65)
SODIUM SERPL-SCNC: 135 MMOL/L (ref 136–145)
WBC # BLD AUTO: 8.4 K/UL (ref 4.6–13.2)

## 2024-01-14 PROCEDURE — 85025 COMPLETE CBC W/AUTO DIFF WBC: CPT

## 2024-01-14 PROCEDURE — 6360000002 HC RX W HCPCS: Performed by: EMERGENCY MEDICINE

## 2024-01-14 PROCEDURE — 80053 COMPREHEN METABOLIC PANEL: CPT

## 2024-01-14 PROCEDURE — 83690 ASSAY OF LIPASE: CPT

## 2024-01-14 PROCEDURE — 71045 X-RAY EXAM CHEST 1 VIEW: CPT

## 2024-01-14 RX ORDER — ONDANSETRON 2 MG/ML
4 INJECTION INTRAMUSCULAR; INTRAVENOUS
Status: COMPLETED | OUTPATIENT
Start: 2024-01-14 | End: 2024-01-14

## 2024-01-14 RX ADMIN — ONDANSETRON 4 MG: 2 INJECTION INTRAMUSCULAR; INTRAVENOUS at 06:39

## 2024-01-14 ASSESSMENT — PAIN - FUNCTIONAL ASSESSMENT: PAIN_FUNCTIONAL_ASSESSMENT: NONE - DENIES PAIN

## 2024-01-14 NOTE — DISCHARGE INSTR - COC
patient):  {CHP DME Belongings:052022978}    RN SIGNATURE:  {Esignature:519987650}    CASE MANAGEMENT/SOCIAL WORK SECTION    Inpatient Status Date: ***    Readmission Risk Assessment Score:  Readmission Risk              Risk of Unplanned Readmission:  0           Discharging to Facility/ Agency   Name:   Address:  Phone:  Fax:    Dialysis Facility (if applicable)   Name:  Address:  Dialysis Schedule:  Phone:  Fax:    / signature: {Esignature:687699613}    PHYSICIAN SECTION    Prognosis: {Prognosis:4937573922}    Condition at Discharge: { Patient Condition:815814294}    Rehab Potential (if transferring to Rehab): {Prognosis:3810080143}    Recommended Labs or Other Treatments After Discharge: ***    Physician Certification: I certify the above information and transfer of Otto Murrieta  is necessary for the continuing treatment of the diagnosis listed and that he requires {Admit to Appropriate Level of Care:92947} for {GREATER/LESS:854864734} 30 days.     Update Admission H&P: {CHP DME Changes in HandP:441373611}    PHYSICIAN SIGNATURE:  {Esignature:712271615}